# Patient Record
Sex: FEMALE | Race: WHITE | NOT HISPANIC OR LATINO | Employment: OTHER | URBAN - METROPOLITAN AREA
[De-identification: names, ages, dates, MRNs, and addresses within clinical notes are randomized per-mention and may not be internally consistent; named-entity substitution may affect disease eponyms.]

---

## 2019-04-26 ENCOUNTER — HOSPITAL ENCOUNTER (OUTPATIENT)
Dept: RADIOLOGY | Facility: HOSPITAL | Age: 39
Discharge: HOME | End: 2019-04-26
Attending: PSYCHIATRY & NEUROLOGY
Payer: COMMERCIAL

## 2019-04-26 DIAGNOSIS — E53.8 BIOTIN-(PROPIONYL-COA-CARBOXYLASE) LIGASE DEFICIENCY: ICD-10-CM

## 2019-04-26 DIAGNOSIS — Z79.899 NEED FOR PROPHYLACTIC CHEMOTHERAPY: Primary | ICD-10-CM

## 2019-04-26 DIAGNOSIS — R20.0 TACTILE ANESTHESIA: ICD-10-CM

## 2019-04-26 DIAGNOSIS — G95.9 CERVICAL MYELOPATHY: ICD-10-CM

## 2019-04-26 DIAGNOSIS — R20.0 NUMBNESS: ICD-10-CM

## 2019-04-26 DIAGNOSIS — E53.8 B12 DEFICIENCY: ICD-10-CM

## 2019-04-26 PROCEDURE — 72141 MRI NECK SPINE W/O DYE: CPT | Mod: TC,PO

## 2019-04-26 PROCEDURE — 72141 MRI CERVICAL SPINE WITHOUT CONTRAST: ICD-10-PCS | Mod: 26,,, | Performed by: RADIOLOGY

## 2019-04-26 PROCEDURE — 72141 MRI NECK SPINE W/O DYE: CPT | Mod: 26,,, | Performed by: RADIOLOGY

## 2021-03-18 ENCOUNTER — IMMUNIZATION (OUTPATIENT)
Dept: PRIMARY CARE CLINIC | Facility: CLINIC | Age: 41
End: 2021-03-18

## 2021-03-18 DIAGNOSIS — Z23 NEED FOR VACCINATION: Primary | ICD-10-CM

## 2021-03-18 PROCEDURE — 0011A COVID-19, MRNA, LNP-S, PF, 100 MCG/0.5 ML DOSE VACCINE: ICD-10-PCS | Mod: CV19,S$GLB,, | Performed by: FAMILY MEDICINE

## 2021-03-18 PROCEDURE — 91301 COVID-19, MRNA, LNP-S, PF, 100 MCG/0.5 ML DOSE VACCINE: ICD-10-PCS | Mod: S$GLB,,, | Performed by: FAMILY MEDICINE

## 2021-03-18 PROCEDURE — 91301 COVID-19, MRNA, LNP-S, PF, 100 MCG/0.5 ML DOSE VACCINE: CPT | Mod: S$GLB,,, | Performed by: FAMILY MEDICINE

## 2021-03-18 PROCEDURE — 0011A COVID-19, MRNA, LNP-S, PF, 100 MCG/0.5 ML DOSE VACCINE: CPT | Mod: CV19,S$GLB,, | Performed by: FAMILY MEDICINE

## 2021-04-15 ENCOUNTER — IMMUNIZATION (OUTPATIENT)
Dept: PRIMARY CARE CLINIC | Facility: CLINIC | Age: 41
End: 2021-04-15

## 2021-04-15 DIAGNOSIS — Z23 NEED FOR VACCINATION: Primary | ICD-10-CM

## 2021-04-15 PROCEDURE — 91301 COVID-19, MRNA, LNP-S, PF, 100 MCG/0.5 ML DOSE VACCINE: CPT | Mod: S$GLB,,, | Performed by: FAMILY MEDICINE

## 2021-04-15 PROCEDURE — 0012A COVID-19, MRNA, LNP-S, PF, 100 MCG/0.5 ML DOSE VACCINE: CPT | Mod: CV19,S$GLB,, | Performed by: FAMILY MEDICINE

## 2021-04-15 PROCEDURE — 91301 COVID-19, MRNA, LNP-S, PF, 100 MCG/0.5 ML DOSE VACCINE: ICD-10-PCS | Mod: S$GLB,,, | Performed by: FAMILY MEDICINE

## 2021-04-15 PROCEDURE — 0012A COVID-19, MRNA, LNP-S, PF, 100 MCG/0.5 ML DOSE VACCINE: ICD-10-PCS | Mod: CV19,S$GLB,, | Performed by: FAMILY MEDICINE

## 2021-07-28 ENCOUNTER — HOSPITAL ENCOUNTER (OUTPATIENT)
Facility: HOSPITAL | Age: 41
Discharge: HOME | End: 2021-07-28
Attending: INTERNAL MEDICINE | Admitting: INTERNAL MEDICINE
Payer: COMMERCIAL

## 2021-07-28 PROCEDURE — 91110 GI TRC IMG INTRAL ESOPH-ILE: CPT

## 2022-10-07 PROBLEM — E03.9 HYPOTHYROIDISM: Status: ACTIVE | Noted: 2022-10-07

## 2024-12-18 ENCOUNTER — HOSPITAL ENCOUNTER (EMERGENCY)
Facility: HOSPITAL | Age: 44
Discharge: HOME | End: 2024-12-18
Attending: EMERGENCY MEDICINE
Payer: COMMERCIAL

## 2024-12-18 VITALS
RESPIRATION RATE: 18 BRPM | TEMPERATURE: 99 F | HEART RATE: 103 BPM | OXYGEN SATURATION: 100 % | WEIGHT: 224.44 LBS | BODY MASS INDEX: 39.76 KG/M2 | DIASTOLIC BLOOD PRESSURE: 102 MMHG | SYSTOLIC BLOOD PRESSURE: 165 MMHG

## 2024-12-18 DIAGNOSIS — R10.9 RIGHT FLANK PAIN: Primary | ICD-10-CM

## 2024-12-18 DIAGNOSIS — N20.1 URETEROLITHIASIS: ICD-10-CM

## 2024-12-18 LAB
ALBUMIN SERPL BCP-MCNC: 3.8 G/DL (ref 3.5–5.2)
ALP SERPL-CCNC: 111 U/L (ref 40–150)
ALT SERPL W/O P-5'-P-CCNC: 12 U/L (ref 10–44)
ANION GAP SERPL CALC-SCNC: 13 MMOL/L (ref 8–16)
AST SERPL-CCNC: 14 U/L (ref 10–40)
B-HCG UR QL: NEGATIVE
BACTERIA #/AREA URNS AUTO: ABNORMAL /HPF
BASOPHILS # BLD AUTO: 0.06 K/UL (ref 0–0.2)
BASOPHILS NFR BLD: 0.5 % (ref 0–1.9)
BILIRUB SERPL-MCNC: 0.2 MG/DL (ref 0.1–1)
BILIRUB UR QL STRIP: NEGATIVE
BUN SERPL-MCNC: 10 MG/DL (ref 6–20)
CALCIUM SERPL-MCNC: 9.3 MG/DL (ref 8.7–10.5)
CHLORIDE SERPL-SCNC: 105 MMOL/L (ref 95–110)
CLARITY UR REFRACT.AUTO: ABNORMAL
CO2 SERPL-SCNC: 24 MMOL/L (ref 23–29)
COLOR UR AUTO: YELLOW
CREAT SERPL-MCNC: 0.8 MG/DL (ref 0.5–1.4)
DIFFERENTIAL METHOD BLD: ABNORMAL
EOSINOPHIL # BLD AUTO: 0.2 K/UL (ref 0–0.5)
EOSINOPHIL NFR BLD: 1.7 % (ref 0–8)
ERYTHROCYTE [DISTWIDTH] IN BLOOD BY AUTOMATED COUNT: 13.3 % (ref 11.5–14.5)
EST. GFR  (NO RACE VARIABLE): >60 ML/MIN/1.73 M^2
GLUCOSE SERPL-MCNC: 106 MG/DL (ref 70–110)
GLUCOSE UR QL STRIP: NEGATIVE
HCT VFR BLD AUTO: 38.9 % (ref 37–48.5)
HGB BLD-MCNC: 13.1 G/DL (ref 12–16)
HGB UR QL STRIP: ABNORMAL
IMM GRANULOCYTES # BLD AUTO: 0.05 K/UL (ref 0–0.04)
IMM GRANULOCYTES NFR BLD AUTO: 0.4 % (ref 0–0.5)
KETONES UR QL STRIP: NEGATIVE
LEUKOCYTE ESTERASE UR QL STRIP: NEGATIVE
LYMPHOCYTES # BLD AUTO: 2.6 K/UL (ref 1–4.8)
LYMPHOCYTES NFR BLD: 20.7 % (ref 18–48)
MCH RBC QN AUTO: 28.8 PG (ref 27–31)
MCHC RBC AUTO-ENTMCNC: 33.7 G/DL (ref 32–36)
MCV RBC AUTO: 86 FL (ref 82–98)
MICROSCOPIC COMMENT: ABNORMAL
MONOCYTES # BLD AUTO: 0.8 K/UL (ref 0.3–1)
MONOCYTES NFR BLD: 6.2 % (ref 4–15)
NEUTROPHILS # BLD AUTO: 8.7 K/UL (ref 1.8–7.7)
NEUTROPHILS NFR BLD: 70.5 % (ref 38–73)
NITRITE UR QL STRIP: NEGATIVE
NRBC BLD-RTO: 0 /100 WBC
PH UR STRIP: 6 [PH] (ref 5–8)
PLATELET # BLD AUTO: 321 K/UL (ref 150–450)
PMV BLD AUTO: 10.4 FL (ref 9.2–12.9)
POTASSIUM SERPL-SCNC: 3.5 MMOL/L (ref 3.5–5.1)
PROT SERPL-MCNC: 7.7 G/DL (ref 6–8.4)
PROT UR QL STRIP: NEGATIVE
RBC # BLD AUTO: 4.55 M/UL (ref 4–5.4)
RBC #/AREA URNS AUTO: >100 /HPF (ref 0–4)
SODIUM SERPL-SCNC: 142 MMOL/L (ref 136–145)
SP GR UR STRIP: 1.01 (ref 1–1.03)
SQUAMOUS #/AREA URNS AUTO: 2 /HPF
URN SPEC COLLECT METH UR: ABNORMAL
UROBILINOGEN UR STRIP-ACNC: NEGATIVE EU/DL
WBC # BLD AUTO: 12.32 K/UL (ref 3.9–12.7)
WBC #/AREA URNS AUTO: 1 /HPF (ref 0–5)

## 2024-12-18 PROCEDURE — 85025 COMPLETE CBC W/AUTO DIFF WBC: CPT | Mod: ER | Performed by: EMERGENCY MEDICINE

## 2024-12-18 PROCEDURE — 99285 EMERGENCY DEPT VISIT HI MDM: CPT | Mod: 25,ER

## 2024-12-18 PROCEDURE — 81000 URINALYSIS NONAUTO W/SCOPE: CPT | Mod: ER | Performed by: EMERGENCY MEDICINE

## 2024-12-18 PROCEDURE — 81025 URINE PREGNANCY TEST: CPT | Mod: ER | Performed by: EMERGENCY MEDICINE

## 2024-12-18 PROCEDURE — 25500020 PHARM REV CODE 255: Mod: ER | Performed by: EMERGENCY MEDICINE

## 2024-12-18 PROCEDURE — 80053 COMPREHEN METABOLIC PANEL: CPT | Mod: ER | Performed by: EMERGENCY MEDICINE

## 2024-12-18 RX ORDER — TAMSULOSIN HYDROCHLORIDE 0.4 MG/1
0.4 CAPSULE ORAL DAILY
Qty: 30 CAPSULE | Refills: 0 | Status: SHIPPED | OUTPATIENT
Start: 2024-12-18 | End: 2025-12-18

## 2024-12-18 RX ORDER — NAPROXEN 500 MG/1
500 TABLET ORAL 2 TIMES DAILY WITH MEALS
Qty: 60 TABLET | Refills: 0 | Status: SHIPPED | OUTPATIENT
Start: 2024-12-18

## 2024-12-18 RX ADMIN — IOHEXOL 100 ML: 350 INJECTION, SOLUTION INTRAVENOUS at 06:12

## 2024-12-18 NOTE — ED PROVIDER NOTES
Encounter Date: 12/18/2024       History     Chief Complaint   Patient presents with    Flank Pain     Right flank and lower abd pain began today. Increased urgency. Nausea.      The history is provided by the patient.   Flank Pain  This is a new problem. The current episode started 3 to 5 hours ago. The problem occurs constantly. The problem has not changed since onset.Associated symptoms include abdominal pain. Pertinent negatives include no chest pain, no headaches and no shortness of breath.     Review of patient's allergies indicates:   Allergen Reactions    Wool     Adhesive Rash    Clarithromycin Nausea And Vomiting and Other (See Comments)     GI INTOL    Clindamycin Nausea And Vomiting and Other (See Comments)     Past Medical History:   Diagnosis Date    Crohn's colitis      Past Surgical History:   Procedure Laterality Date    INTRALUMINAL GASTROINTESTINAL TRACT IMAGING VIA CAPSULE N/A 7/28/2021    Procedure: IMAGING PROCEDURE, GI TRACT, INTRALUMINAL, VIA CAPSULE;  Surgeon: Misbah Menendez RN;  Location: [facility];  Service: Endoscopy;  Laterality: N/A;     Family History   Problem Relation Name Age of Onset    Hypertension Father      Breast cancer Maternal Aunt      Hypertension Maternal Grandmother      Hypertension Maternal Grandfather       Social History     Tobacco Use    Smoking status: Never    Smokeless tobacco: Never   Substance Use Topics    Alcohol use: Not Currently    Drug use: Yes     Types: Marijuana     Review of Systems   Constitutional:  Negative for fever.   HENT: Negative.  Negative for congestion and sore throat.    Eyes: Negative.    Respiratory: Negative.  Negative for cough and shortness of breath.    Cardiovascular:  Negative for chest pain.   Gastrointestinal:  Positive for abdominal pain. Negative for nausea and vomiting.   Genitourinary:  Positive for flank pain. Negative for dysuria.   Neurological:  Negative for weakness, numbness and headaches.   Psychiatric/Behavioral:   Negative for confusion.        Physical Exam     Initial Vitals [12/18/24 1736]   BP Pulse Resp Temp SpO2   (!) 165/102 103 18 98.7 °F (37.1 °C) 100 %      MAP       --         Physical Exam    Constitutional: She appears well-developed and well-nourished. No distress.   HENT:   Head: Normocephalic and atraumatic.   Eyes: Conjunctivae are normal. Pupils are equal, round, and reactive to light.   Neck: Neck supple.   Normal range of motion.  Cardiovascular:  Regular rhythm and normal heart sounds.   Tachycardia present.         Pulmonary/Chest: Breath sounds normal.   Abdominal: Abdomen is soft. Bowel sounds are normal. She exhibits no distension. There is no abdominal tenderness. There is no rebound.   Musculoskeletal:         General: No edema. Normal range of motion.      Cervical back: Normal range of motion and neck supple.     Neurological: She is alert and oriented to person, place, and time. She has normal strength.   Skin: Skin is warm and dry.   Psychiatric: She has a normal mood and affect.         ED Course   Procedures  Labs Reviewed   CBC W/ AUTO DIFFERENTIAL - Abnormal       Result Value    WBC 12.32      RBC 4.55      Hemoglobin 13.1      Hematocrit 38.9      MCV 86      MCH 28.8      MCHC 33.7      RDW 13.3      Platelets 321      MPV 10.4      Immature Granulocytes 0.4      Gran # (ANC) 8.7 (*)     Immature Grans (Abs) 0.05 (*)     Lymph # 2.6      Mono # 0.8      Eos # 0.2      Baso # 0.06      nRBC 0      Gran % 70.5      Lymph % 20.7      Mono % 6.2      Eosinophil % 1.7      Basophil % 0.5      Differential Method Automated     URINALYSIS, REFLEX TO URINE CULTURE - Abnormal    Specimen UA Urine, Clean Catch      Color, UA Yellow      Appearance, UA Hazy (*)     pH, UA 6.0      Specific Gravity, UA 1.015      Protein, UA Negative      Glucose, UA Negative      Ketones, UA Negative      Bilirubin (UA) Negative      Occult Blood UA 3+ (*)     Nitrite, UA Negative      Urobilinogen, UA Negative       Leukocytes, UA Negative      Narrative:     Specimen Source->Urine   URINALYSIS MICROSCOPIC - Abnormal    RBC, UA >100 (*)     WBC, UA 1      Bacteria Rare      Squam Epithel, UA 2      Microscopic Comment SEE COMMENT      Narrative:     Specimen Source->Urine   COMPREHENSIVE METABOLIC PANEL    Sodium 142      Potassium 3.5      Chloride 105      CO2 24      Glucose 106      BUN 10      Creatinine 0.8      Calcium 9.3      Total Protein 7.7      Albumin 3.8      Total Bilirubin 0.2      Alkaline Phosphatase 111      AST 14      ALT 12      eGFR >60.0      Anion Gap 13     PREGNANCY TEST, URINE RAPID    Preg Test, Ur Negative            Imaging Results               CT Abdomen Pelvis With IV Contrast NO Oral Contrast (Final result)  Result time 12/18/24 18:55:03      Final result by Dae Hammond MD (12/18/24 18:55:03)                   Impression:      3 mm distal right ureteral stone. There appears to be soft tissue thickening of the distal ureter near the ureterovesicular junction. This could relate to infection but neoplasm is not excluded on the basis of this exam.  Moderate to severe right hydronephrosis out of proportion to the distal ureteral stone. Consider further evaluation with cystoscopy when clinically appropriate.    Marked pelvic vascularity.  Possible pelvic venous congestion syndrome.    This report was flagged in Epic as abnormal.    All CT scans at this facility are performed  using dose modulation techniques as appropriate to performed exam including the following:  automated exposure control; adjustment of mA and/or kV according to the patients size (this includes techniques or standardized protocols for targeted exams where dose is matched to indication/reason for exam: i.e. extremities or head);  iterative reconstruction technique.      Electronically signed by: Dae Hammond  Date:    12/18/2024  Time:    18:55               Narrative:    EXAMINATION:  CT ABDOMEN PELVIS WITH IV  CONTRAST    CLINICAL HISTORY:  RLQ abdominal pain (Age >= 14y);    TECHNIQUE:  Low dose axial images, sagittal and coronal reformations were obtained from the lung bases to the pubic symphysis.  Contrast was administered.  Images acquired after the administration 100 mL Omnipaque 350 IV contrast.    COMPARISON:  None    FINDINGS:  Heart: Normal in size. No pericardial effusion.    Lung Bases: Well aerated, without consolidation or pleural fluid.    Liver: Normal in size and attenuation, with no focal hepatic lesions.    Gallbladder: No calcified gallstones.    Bile Ducts: No evidence of dilated ducts.    Pancreas: No mass or peripancreatic fat stranding.    Spleen: Unremarkable.    Adrenals: Unremarkable.    Kidneys/ Ureters: 3 mm distal right ureteral stone.  There appears to be soft tissue thickening of the distal ureter near the ureterovesicular junction.  This could relate to infection but neoplasm is not excluded on the basis of this exam.  Moderate to severe right hydronephrosis out of proportion to the distal ureteral stone.  Consider further evaluation with cystoscopy when clinically appropriate.    Bladder: No evidence of wall thickening.    Reproductive organs: Marked pelvic vascularity possibly pelvic venous congestion syndrome.  Further evaluation as needed.    GI Tract/Mesentery: No evidence of bowel obstruction or inflammation.  No evidence of appendicitis.    Peritoneal Space: No ascites. No free air.    Retroperitoneum: No significant adenopathy.    Abdominal wall: Unremarkable.    Vasculature: No significant atherosclerosis or aneurysm.    Bones: No acute fracture.                                       Medications   iohexoL (OMNIPAQUE 350) injection 100 mL (100 mLs Intravenous Given 12/18/24 5938)     Medical Decision Making  DDx Uti, kidney stone, appendicitis    Problems Addressed:  Right flank pain: acute illness or injury  Ureterolithiasis: undiagnosed new problem with uncertain  prognosis    Amount and/or Complexity of Data Reviewed  Labs: ordered.  Radiology: ordered.    Risk  Prescription drug management.  Decision regarding hospitalization.  Risk Details: Discussed nature of kidney stone, severe hydro, need for close f/u c Urology, pt states she has a Urologist that she sees and can f/u this week. Pt refusing pain meds, agrees to flomax. Pt in nad./    7:06 PM - Counseling: Spoke with the patient and discussed todays findings, in addition to providing specific details for the plan of care and counseling regarding the diagnosis and prognosis. Questions are answered at this time.                                     Clinical Impression:  Final diagnoses:  [R10.9] Right flank pain (Primary)  [N20.1] Ureterolithiasis          ED Disposition Condition    Discharge Stable          ED Prescriptions       Medication Sig Dispense Start Date End Date Auth. Provider    tamsulosin (FLOMAX) 0.4 mg Cap Take 1 capsule (0.4 mg total) by mouth once daily. 30 capsule 12/18/2024 12/18/2025 Fazal Hernandez MD    naproxen (NAPROSYN) 500 MG tablet Take 1 tablet (500 mg total) by mouth 2 (two) times daily with meals. 60 tablet 12/18/2024 -- Fazal Hernandez MD          Follow-up Information       Follow up With Specialties Details Why Contact Info Additional Information    [facility] Schedule an appointment as soon as possible for a visit   [address] [facility] [id number] [phone] Please park on the [address] side and use the Clinic entrance. Check in on the 3rd floor, to the right.    Misael Bowling MD Endocrinology Schedule an appointment as soon as possible for a visit   [address] [facility] [address] [phone]                Fazal Hernandez MD  12/18/24 8885

## 2025-05-21 ENCOUNTER — ANESTHESIA (OUTPATIENT)
Dept: ANESTHESIOLOGY | Facility: HOSPITAL | Age: 45
End: 2025-05-21
Payer: COMMERCIAL

## 2025-05-21 ENCOUNTER — HOSPITAL ENCOUNTER (INPATIENT)
Facility: HOSPITAL | Age: 45
LOS: 2 days | Discharge: HOME | DRG: 163 | End: 2025-05-23
Attending: EMERGENCY MEDICINE | Admitting: FAMILY MEDICINE
Payer: COMMERCIAL

## 2025-05-21 DIAGNOSIS — I26.99 PULMONARY EMBOLISM, BILATERAL: ICD-10-CM

## 2025-05-21 DIAGNOSIS — R79.89 ELEVATED TROPONIN: ICD-10-CM

## 2025-05-21 DIAGNOSIS — R06.02 SOB (SHORTNESS OF BREATH): Primary | ICD-10-CM

## 2025-05-21 DIAGNOSIS — I26.99 ACUTE PULMONARY EMBOLISM, UNSPECIFIED PULMONARY EMBOLISM TYPE, UNSPECIFIED WHETHER ACUTE COR PULMONALE PRESENT: ICD-10-CM

## 2025-05-21 DIAGNOSIS — R00.0 TACHYCARDIA: ICD-10-CM

## 2025-05-21 LAB
ABSOLUTE EOSINOPHIL (OHS): 0.09 K/UL
ABSOLUTE MONOCYTE (OHS): 0.81 K/UL (ref 0.3–1)
ABSOLUTE NEUTROPHIL COUNT (OHS): 10.3 K/UL (ref 1.8–7.7)
ALBUMIN SERPL BCP-MCNC: 3.9 G/DL (ref 3.5–5.2)
ALP SERPL-CCNC: 104 UNIT/L (ref 40–150)
ALT SERPL W/O P-5'-P-CCNC: 21 UNIT/L (ref 10–44)
ANION GAP (OHS): 14 MMOL/L (ref 8–16)
APTT PPP: 25.7 SECONDS (ref 21–32)
AST SERPL-CCNC: 23 UNIT/L (ref 11–45)
BASOPHILS # BLD AUTO: 0.03 K/UL
BASOPHILS NFR BLD AUTO: 0.2 %
BILIRUB SERPL-MCNC: 0.3 MG/DL (ref 0.1–1)
BNP SERPL-MCNC: 45 PG/ML (ref 0–99)
BUN SERPL-MCNC: 14 MG/DL (ref 6–20)
CALCIUM SERPL-MCNC: 9.5 MG/DL (ref 8.7–10.5)
CHLORIDE SERPL-SCNC: 106 MMOL/L (ref 95–110)
CO2 SERPL-SCNC: 21 MMOL/L (ref 23–29)
CREAT SERPL-MCNC: 1 MG/DL (ref 0.5–1.4)
CTP QC/QA: YES
CTP QC/QA: YES
D DIMER PPP IA.FEU-MCNC: 2.26 MG/L FEU
ERYTHROCYTE [DISTWIDTH] IN BLOOD BY AUTOMATED COUNT: 13 % (ref 11.5–14.5)
GFR SERPLBLD CREATININE-BSD FMLA CKD-EPI: >60 ML/MIN/1.73/M2
GLUCOSE SERPL-MCNC: 114 MG/DL (ref 70–110)
HCT VFR BLD AUTO: 40.7 % (ref 37–48.5)
HGB BLD-MCNC: 13.5 GM/DL (ref 12–16)
HOLD SPECIMEN: NORMAL
IMM GRANULOCYTES # BLD AUTO: 0.04 K/UL (ref 0–0.04)
IMM GRANULOCYTES NFR BLD AUTO: 0.3 % (ref 0–0.5)
INR PPP: 0.9 (ref 0.8–1.2)
LYMPHOCYTES # BLD AUTO: 2.04 K/UL (ref 1–4.8)
MCH RBC QN AUTO: 28.5 PG (ref 27–31)
MCHC RBC AUTO-ENTMCNC: 33.2 G/DL (ref 32–36)
MCV RBC AUTO: 86 FL (ref 82–98)
NUCLEATED RBC (/100WBC) (OHS): 0 /100 WBC
PLATELET # BLD AUTO: 296 K/UL (ref 150–450)
PMV BLD AUTO: 10.6 FL (ref 9.2–12.9)
POC MOLECULAR INFLUENZA A AGN: NEGATIVE
POC MOLECULAR INFLUENZA B AGN: NEGATIVE
POTASSIUM SERPL-SCNC: 3.5 MMOL/L (ref 3.5–5.1)
PROT SERPL-MCNC: 7.8 GM/DL (ref 6–8.4)
PROTHROMBIN TIME: 10.7 SECONDS (ref 9–12.5)
RBC # BLD AUTO: 4.74 M/UL (ref 4–5.4)
RELATIVE EOSINOPHIL (OHS): 0.7 %
RELATIVE LYMPHOCYTE (OHS): 15.3 % (ref 18–48)
RELATIVE MONOCYTE (OHS): 6.1 % (ref 4–15)
RELATIVE NEUTROPHIL (OHS): 77.4 % (ref 38–73)
SARS-COV-2 RDRP RESP QL NAA+PROBE: NEGATIVE
SODIUM SERPL-SCNC: 141 MMOL/L (ref 136–145)
TROPONIN I SERPL DL<=0.01 NG/ML-MCNC: 1.27 NG/ML
TSH SERPL-ACNC: 1.99 UIU/ML (ref 0.4–4)
WBC # BLD AUTO: 13.31 K/UL (ref 3.9–12.7)

## 2025-05-21 PROCEDURE — 94761 N-INVAS EAR/PLS OXIMETRY MLT: CPT | Mod: ER

## 2025-05-21 PROCEDURE — 85610 PROTHROMBIN TIME: CPT | Mod: ER | Performed by: EMERGENCY MEDICINE

## 2025-05-21 PROCEDURE — 27000221 HC OXYGEN, UP TO 24 HOURS: Mod: ER

## 2025-05-21 PROCEDURE — 82306 VITAMIN D 25 HYDROXY: CPT | Performed by: FAMILY MEDICINE

## 2025-05-21 PROCEDURE — 84484 ASSAY OF TROPONIN QUANT: CPT | Mod: ER | Performed by: EMERGENCY MEDICINE

## 2025-05-21 PROCEDURE — 85025 COMPLETE CBC W/AUTO DIFF WBC: CPT | Mod: ER | Performed by: EMERGENCY MEDICINE

## 2025-05-21 PROCEDURE — 83880 ASSAY OF NATRIURETIC PEPTIDE: CPT | Mod: ER | Performed by: EMERGENCY MEDICINE

## 2025-05-21 PROCEDURE — 93010 ELECTROCARDIOGRAM REPORT: CPT | Mod: ,,, | Performed by: INTERNAL MEDICINE

## 2025-05-21 PROCEDURE — 37000008 HC ANESTHESIA 1ST 15 MINUTES: Performed by: STUDENT IN AN ORGANIZED HEALTH CARE EDUCATION/TRAINING PROGRAM

## 2025-05-21 PROCEDURE — 99285 EMERGENCY DEPT VISIT HI MDM: CPT | Mod: 25,ER

## 2025-05-21 PROCEDURE — 84443 ASSAY THYROID STIM HORMONE: CPT | Mod: ER | Performed by: EMERGENCY MEDICINE

## 2025-05-21 PROCEDURE — 21400001 HC TELEMETRY ROOM

## 2025-05-21 PROCEDURE — 86803 HEPATITIS C AB TEST: CPT | Performed by: EMERGENCY MEDICINE

## 2025-05-21 PROCEDURE — 85379 FIBRIN DEGRADATION QUANT: CPT | Mod: ER | Performed by: EMERGENCY MEDICINE

## 2025-05-21 PROCEDURE — 63600175 PHARM REV CODE 636 W HCPCS: Performed by: EMERGENCY MEDICINE

## 2025-05-21 PROCEDURE — 25000003 PHARM REV CODE 250: Mod: ER | Performed by: EMERGENCY MEDICINE

## 2025-05-21 PROCEDURE — 37000009 HC ANESTHESIA EA ADD 15 MINS: Performed by: STUDENT IN AN ORGANIZED HEALTH CARE EDUCATION/TRAINING PROGRAM

## 2025-05-21 PROCEDURE — 87635 SARS-COV-2 COVID-19 AMP PRB: CPT | Mod: ER | Performed by: EMERGENCY MEDICINE

## 2025-05-21 PROCEDURE — 80053 COMPREHEN METABOLIC PANEL: CPT | Mod: ER | Performed by: EMERGENCY MEDICINE

## 2025-05-21 PROCEDURE — 85730 THROMBOPLASTIN TIME PARTIAL: CPT | Mod: ER | Performed by: EMERGENCY MEDICINE

## 2025-05-21 PROCEDURE — 87502 INFLUENZA DNA AMP PROBE: CPT | Mod: ER

## 2025-05-21 PROCEDURE — 25500020 PHARM REV CODE 255: Mod: ER | Performed by: EMERGENCY MEDICINE

## 2025-05-21 PROCEDURE — 87389 HIV-1 AG W/HIV-1&-2 AB AG IA: CPT | Performed by: EMERGENCY MEDICINE

## 2025-05-21 PROCEDURE — 63600175 PHARM REV CODE 636 W HCPCS: Mod: ER | Performed by: EMERGENCY MEDICINE

## 2025-05-21 PROCEDURE — 93005 ELECTROCARDIOGRAM TRACING: CPT | Mod: ER

## 2025-05-21 RX ORDER — HEPARIN SODIUM,PORCINE/D5W 25000/250
0-40 INTRAVENOUS SOLUTION INTRAVENOUS CONTINUOUS
Status: DISCONTINUED | OUTPATIENT
Start: 2025-05-21 | End: 2025-05-21

## 2025-05-21 RX ORDER — HEPARIN SODIUM,PORCINE/D5W 25000/250
0-40 INTRAVENOUS SOLUTION INTRAVENOUS CONTINUOUS
Status: DISCONTINUED | OUTPATIENT
Start: 2025-05-21 | End: 2025-05-22

## 2025-05-21 RX ORDER — FENTANYL CITRATE 50 UG/ML
50 INJECTION, SOLUTION INTRAMUSCULAR; INTRAVENOUS
Refills: 0 | Status: COMPLETED | OUTPATIENT
Start: 2025-05-21 | End: 2025-05-21

## 2025-05-21 RX ORDER — MORPHINE SULFATE 4 MG/ML
4 INJECTION, SOLUTION INTRAMUSCULAR; INTRAVENOUS
Refills: 0 | Status: DISCONTINUED | OUTPATIENT
Start: 2025-05-21 | End: 2025-05-21

## 2025-05-21 RX ADMIN — HEPARIN SODIUM 18 UNITS/KG/HR: 10000 INJECTION, SOLUTION INTRAVENOUS at 11:05

## 2025-05-21 RX ADMIN — IOHEXOL 100 ML: 350 INJECTION, SOLUTION INTRAVENOUS at 07:05

## 2025-05-21 RX ADMIN — HEPARIN SODIUM 18 UNITS/KG/HR: 10000 INJECTION, SOLUTION INTRAVENOUS at 09:05

## 2025-05-21 RX ADMIN — FENTANYL CITRATE 50 MCG: 50 INJECTION INTRAMUSCULAR; INTRAVENOUS at 10:05

## 2025-05-21 RX ADMIN — SODIUM CHLORIDE 1000 ML: 9 INJECTION, SOLUTION INTRAVENOUS at 06:05

## 2025-05-21 NOTE — ED PROVIDER NOTES
"Encounter Date: 5/21/2025       History     Chief Complaint   Patient presents with    Shortness of Breath     That began around lunch. Pt reports " my heart is racing." Feels tightness to chest. Pt reports falling on Monday. Broke left leg to distal fibula.     46 y/o F with PMH of Crohn's disease here with c/o SOB. She was at work when she rolled her ankle 2d ago, and XR revealed distal fibula fracture at level of the ankle. She was placed in a boot, and will f/u back up in 4 weeks. She has become progressively more SOB since the injury, and reporting palpitations with elevated heart rate in the 100's. Denies any increased leg swelling or pain. She does not take birth control. She has been using her walking boot, and does not report sedentary behavior the past 2 days.     The history is provided by the patient.     Review of patient's allergies indicates:   Allergen Reactions    Wool     Adhesive Rash    Clarithromycin Nausea And Vomiting and Other (See Comments)     GI INTOL    Clindamycin Nausea And Vomiting and Other (See Comments)     Past Medical History:   Diagnosis Date    Crohn's colitis      Past Surgical History:   Procedure Laterality Date    INTRALUMINAL GASTROINTESTINAL TRACT IMAGING VIA CAPSULE N/A 7/28/2021    Procedure: IMAGING PROCEDURE, GI TRACT, INTRALUMINAL, VIA CAPSULE;  Surgeon: Misbah Menendez RN;  Location: [facility];  Service: Endoscopy;  Laterality: N/A;     Family History   Problem Relation Name Age of Onset    Hypertension Father      Breast cancer Maternal Aunt      Hypertension Maternal Grandmother      Hypertension Maternal Grandfather       Social History[1]  Review of Systems   Constitutional:  Negative for diaphoresis and fever.   HENT:  Negative for congestion, dental problem and sore throat.    Eyes:  Negative for pain and visual disturbance.   Respiratory:  Positive for shortness of breath. Negative for cough.    Cardiovascular:  Positive for palpitations. Negative for chest pain. "   Gastrointestinal:  Negative for abdominal pain, diarrhea, nausea and vomiting.   Genitourinary:  Negative for dysuria and flank pain.   Musculoskeletal:  Positive for arthralgias. Negative for back pain and neck pain.   Skin:  Negative for rash and wound.   Neurological:  Negative for weakness, numbness and headaches.   Psychiatric/Behavioral:  Negative for agitation and confusion.        Physical Exam     Initial Vitals [05/21/25 1720]   BP Pulse Resp Temp SpO2   133/87 (!) 128 18 98.3 °F (36.8 °C) 96 %      MAP       --         Physical Exam    Constitutional: She appears well-developed and well-nourished.   HENT:   Head: Normocephalic and atraumatic.   Eyes: EOM are normal. Pupils are equal, round, and reactive to light.   Neck: Neck supple.   Normal range of motion.  Cardiovascular:  Regular rhythm and intact distal pulses.           tachycardic   Pulmonary/Chest: Breath sounds normal. No respiratory distress.   Abdominal: She exhibits no distension. There is no abdominal tenderness.   Musculoskeletal:      Cervical back: Normal range of motion and neck supple.      Comments: There is left ankle boot present, and when removed minimal edema around lower leg and ankle. No proximal pre-tibial edema. Mild lateral malleolar tenderness.      Neurological: She is alert and oriented to person, place, and time. She has normal strength. No sensory deficit.   Skin: Skin is warm and dry.   Psychiatric: She has a normal mood and affect.         ED Course   Critical Care    Date/Time: 5/21/2025 10:58 PM    Performed by: Magen Oscar Jr., MD  Authorized by: Magen Oscar Jr., MD  Direct patient critical care time: 10 minutes  Additional history critical care time: 10 minutes  Ordering / reviewing critical care time: 10 minutes  Documentation critical care time: 10 minutes  Consulting other physicians critical care time: 10 minutes  Consult with family critical care time: 10 minutes  Other critical care time: 15  minutes  Total critical care time (exclusive of procedural time) : 75 minutes  Critical care time was exclusive of separately billable procedures and treating other patients and teaching time.  Critical care was necessary to treat or prevent imminent or life-threatening deterioration of the following conditions: circulatory failure, cardiac failure and respiratory failure.  Critical care was time spent personally by me on the following activities: blood draw for specimens, development of treatment plan with patient or surrogate, discussions with consultants, interpretation of cardiac output measurements, evaluation of patient's response to treatment, examination of patient, obtaining history from patient or surrogate, ordering and performing treatments and interventions, ordering and review of laboratory studies, ordering and review of radiographic studies, pulse oximetry, re-evaluation of patient's condition and review of old charts.        Labs Reviewed   D DIMER, QUANTITATIVE - Abnormal       Result Value    D-Dimer 2.26 (*)    TROPONIN I - Abnormal    Troponin-I 1.275 (*)    COMPREHENSIVE METABOLIC PANEL - Abnormal    Sodium 141      Potassium 3.5      Chloride 106      CO2 21 (*)     Glucose 114 (*)     BUN 14      Creatinine 1.0      Calcium 9.5      Protein Total 7.8      Albumin 3.9      Bilirubin Total 0.3            AST 23      ALT 21      Anion Gap 14      eGFR >60     CBC WITH DIFFERENTIAL - Abnormal    WBC 13.31 (*)     RBC 4.74      HGB 13.5      HCT 40.7      MCV 86      MCH 28.5      MCHC 33.2      RDW 13.0      Platelet Count 296      MPV 10.6      Nucleated RBC 0      Neut % 77.4 (*)     Lymph % 15.3 (*)     Mono % 6.1      Eos % 0.7      Basophil % 0.2      Imm Grans % 0.3      Neut # 10.30 (*)     Lymph # 2.04      Mono # 0.81      Eos # 0.09      Baso # 0.03      Imm Grans # 0.04     TSH - Normal    TSH 1.992     B-TYPE NATRIURETIC PEPTIDE - Normal    BNP 45     APTT - Normal    PTT 25.7      PROTIME-INR - Normal    PT 10.7      INR 0.9     HEP C VIRUS HOLD SPECIMEN    Extra Tube Hold for add-ons.     CBC W/ AUTO DIFFERENTIAL    Narrative:     The following orders were created for panel order CBC auto differential.  Procedure                               Abnormality         Status                     ---------                               -----------         ------                     CBC with Differential[[id number]]       Abnormal            Final result                 Please view results for these tests on the individual orders.   HEPATITIS C ANTIBODY   HIV 1 / 2 ANTIBODY   SARS-COV-2 RDRP GENE    POC Rapid COVID Negative       Acceptable Yes     POCT INFLUENZA A/B MOLECULAR    POC Molecular Influenza A Ag Negative      POC Molecular Influenza B Ag Negative       Acceptable Yes       EKG Readings: (Independently Interpreted)   Rate of 124 beats per minute.  Sinus tachycardia.  Normal axis.  P.r., QRS and QTC intervals within normal limits.  No STEMI.       Imaging Results               CTA Chest Non-Coronary (PE Studies) (Final result)  Result time 05/21/25 19:59:41      Final result by Charles LopezFrankiMD mirna (05/21/25 19:59:41)                   Impression:      Bilateral pulmonary embolism involving the main pulmonary arteries extending into segmental and lobar branches of all lobes of the lung.  Right ventricle strain and hypertrophy is present.  No evidence of pulmonary infarction.    This report was flagged in Epic as abnormal.    All CT scans at this facility use dose modulation, iterative reconstruction and/or weight based dosing when appropriate to reduce radiation dose to as low as reasonably achievable.      Electronically signed by: Charles Lopez MD  Date:    05/21/2025  Time:    19:59               Narrative:    EXAMINATION:  CTA CHEST NON CORONARY (PE STUDIES)    CLINICAL HISTORY:  Pulmonary embolism (PE) suspected, high prob;Pulmonary embolism  (PE) suspected, positive D-dimer;    TECHNIQUE:  Standard CTA of the chest performed with 100 cc Omnipaque 350 utilizing 3-D MIP reformations.    COMPARISON:  None    FINDINGS:  Heart is normal in size.  However there is evidence of right ventricle enlargement with bulging of the intraventricular septum into the left ventricle.  Findings are compatible with right ventricle strain and hypertrophy.    No coronary artery calcifications.  No pericardial effusions    There is pulmonary embolism involving the main left pulmonary artery extending into the lobar and segmental branches of the left upper lobe and lower lobe.  There is also pulmonary embolism involving the right main pulmonary artery extending into lobar and segmental branches of the right upper middle and lower lobes.    The lungs appear clear.  No evidence of infarction.  No evidence of pleural effusion or pneumothorax.    No evidence of mediastinal hilar adenopathy.                                       X-Ray Chest AP Portable (Final result)  Result time 05/21/25 18:35:08      Final result by Charles Lopez MD (Timothy) (05/21/25 18:35:08)                   Impression:     Normal chest.    Finalized on: 5/21/2025 6:35 PM By:  Crescencio Lopez MD  [facility]# [id number]      2025-05-21 18:37:13.593     [facility]               Narrative:    EXAM: XR CHEST AP PORTABLE    CLINICAL HISTORY: Shortness of breath    FINDINGS:  Heart is normal in size.  Lungs are clear.    One view.                                         Medications   heparin 25,000 units in dextrose 5% 250 mL (100 units/mL) infusion HIGH INTENSITY nomogram - [facility] (18 Units/kg/hr × 95.3 kg Intravenous New Bag 5/21/25 7946)   heparin 25,000 units in dextrose 5% (100 units/ml) IV bolus from bag HIGH INTENSITY nomogram - [facility] (has no administration in time range)   heparin 25,000 units in dextrose 5% (100 units/ml) IV bolus from bag HIGH INTENSITY nomogram - [facility] (has no administration in time range)   sodium chloride  0.9% bolus 1,000 mL 1,000 mL (1,000 mLs Intravenous New Bag 5/21/25 1834)   iohexoL (OMNIPAQUE 350) injection 100 mL (100 mLs Intravenous Given 5/21/25 1944)   heparin 25,000 units in dextrose 5% (100 units/ml) IV bolus from bag HIGH INTENSITY nomogram - [facility] (7,624 Units Intravenous Bolus from Bag 5/21/25 2103)   fentaNYL 50 mcg/mL injection 50 mcg (50 mcg Intravenous Given 5/21/25 2208)     Medical Decision Making  Differential diagnosis includes pneumonia, COPD exacerbation, asthma exacerbation, heart failure, anemia, ACS, bronchitis, viral syndrome, influenza, covid, RSV, pneumothorax, pleural effusion, pulmonary embolism, mucus plugging, bronchiectasis, diaphragmatic paralysis, pneumoconiosis      Amount and/or Complexity of Data Reviewed  Labs: ordered. Decision-making details documented in ED Course.  Radiology: ordered and independent interpretation performed. Decision-making details documented in ED Course.  ECG/medicine tests: ordered and independent interpretation performed. Decision-making details documented in ED Course.    Risk  OTC drugs.  Prescription drug management.  Parenteral controlled substances.  Decision regarding hospitalization.  Risk Details: OTC drugs, prescription drugs and controlled substances considered.  Due to patient's symptoms improving and pain controlled pain medications ordered appropriately.  Differential diagnoses:  Pneumonia, Congestive heart failure, Acute Myocardial infarction, Pleural effusion, Pulmonary edema, Pulmonary embolism, Electrolyte imbalance, Infectious etiology, COPD exacerbation, Asthma exacerbation, Pneumothorax,  Cardiac tamponade, Anemia, Deconditioning, bronchospasm, upper airway obstruction such: Aspiration, Foreign body among others.                 ED Course as of 05/21/25 6238   Wed May 21, 2025   1859 Rhythm strip reviewed. Sinus tachycardia, no stemi. 124 bpm. [LV]      ED Course User Index  [LV] Magen Oscar Jr., MD            Vitals:    05/21/25  "1720 05/21/25 1748 05/21/25 1919 05/21/25 2001   BP: 133/87 127/79     Pulse: (!) 128 (!) 124 (!) 118 (!) 123   Resp: 18 19     Temp: 98.3 °F (36.8 °C)      TempSrc: Oral      SpO2: 96% 96% 98% 96%   Weight: 95.3 kg (210 lb)      Height: 5' 2" (1.575 m)       05/21/25 2039 05/21/25 2108 05/21/25 2121 05/21/25 2137   BP:  132/88  (!) 144/86   Pulse: (!) 124 (!) 120 (!) 121 (!) 122   Resp:       Temp:       TempSrc:       SpO2: 96% 97% 96% 96%   Weight:       Height:        05/21/25 2153 05/21/25 2208 05/21/25 2216   BP:   (!) 139/93   Pulse: (!) 118  (!) 115   Resp:  18    Temp:      TempSrc:      SpO2: 96%  98%   Weight:      Height:          Results for orders placed or performed during the hospital encounter of 05/21/25   HCV Virus Hold Specimen    Collection Time: 05/21/25  6:33 PM   Result Value Ref Range    Extra Tube Hold for add-ons.    D dimer, quantitative    Collection Time: 05/21/25  6:33 PM   Result Value Ref Range    D-Dimer 2.26 (H) <0.50 mg/L FEU   TSH    Collection Time: 05/21/25  6:33 PM   Result Value Ref Range    TSH 1.992 0.400 - 4.000 uIU/mL   Troponin I    Collection Time: 05/21/25  6:33 PM   Result Value Ref Range    Troponin-I 1.275 (H) <=0.026 ng/mL   Brain natriuretic peptide    Collection Time: 05/21/25  6:33 PM   Result Value Ref Range    BNP 45 0 - 99 pg/mL   Comprehensive metabolic panel    Collection Time: 05/21/25  6:33 PM   Result Value Ref Range    Sodium 141 136 - 145 mmol/L    Potassium 3.5 3.5 - 5.1 mmol/L    Chloride 106 95 - 110 mmol/L    CO2 21 (L) 23 - 29 mmol/L    Glucose 114 (H) 70 - 110 mg/dL    BUN 14 6 - 20 mg/dL    Creatinine 1.0 0.5 - 1.4 mg/dL    Calcium 9.5 8.7 - 10.5 mg/dL    Protein Total 7.8 6.0 - 8.4 gm/dL    Albumin 3.9 3.5 - 5.2 g/dL    Bilirubin Total 0.3 0.1 - 1.0 mg/dL     40 - 150 unit/L    AST 23 11 - 45 unit/L    ALT 21 10 - 44 unit/L    Anion Gap 14 8 - 16 mmol/L    eGFR >60 >60 mL/min/1.73/m2   CBC with Differential    Collection Time: 05/21/25  " 6:33 PM   Result Value Ref Range    WBC 13.31 (H) 3.90 - 12.70 K/uL    RBC 4.74 4.00 - 5.40 M/uL    HGB 13.5 12.0 - 16.0 gm/dL    HCT 40.7 37.0 - 48.5 %    MCV 86 82 - 98 fL    MCH 28.5 27.0 - 31.0 pg    MCHC 33.2 32.0 - 36.0 g/dL    RDW 13.0 11.5 - 14.5 %    Platelet Count 296 150 - 450 K/uL    MPV 10.6 9.2 - 12.9 fL    Nucleated RBC 0 <=0 /100 WBC    Neut % 77.4 (H) 38 - 73 %    Lymph % 15.3 (L) 18 - 48 %    Mono % 6.1 4 - 15 %    Eos % 0.7 <=8 %    Basophil % 0.2 <=1.9 %    Imm Grans % 0.3 0.0 - 0.5 %    Neut # 10.30 (H) 1.8 - 7.7 K/uL    Lymph # 2.04 1 - 4.8 K/uL    Mono # 0.81 0.3 - 1 K/uL    Eos # 0.09 <=0.5 K/uL    Baso # 0.03 <=0.2 K/uL    Imm Grans # 0.04 0.00 - 0.04 K/uL   APTT    Collection Time: 05/21/25  6:33 PM   Result Value Ref Range    PTT 25.7 21.0 - 32.0 seconds   Protime-INR    Collection Time: 05/21/25  6:33 PM   Result Value Ref Range    PT 10.7 9.0 - 12.5 seconds    INR 0.9 0.8 - 1.2   POCT COVID-19 Rapid Screening    Collection Time: 05/21/25  8:23 PM   Result Value Ref Range    POC Rapid COVID Negative Negative     Acceptable Yes    POCT Influenza A/B Molecular    Collection Time: 05/21/25  8:23 PM   Result Value Ref Range    POC Molecular Influenza A Ag Negative Negative    POC Molecular Influenza B Ag Negative Negative     Acceptable Yes          Imaging Results               CTA Chest Non-Coronary (PE Studies) (Final result)  Result time 05/21/25 19:59:41      Final result by Charles Lopez MD (Timothy) (05/21/25 19:59:41)                   Impression:      Bilateral pulmonary embolism involving the main pulmonary arteries extending into segmental and lobar branches of all lobes of the lung.  Right ventricle strain and hypertrophy is present.  No evidence of pulmonary infarction.    This report was flagged in Epic as abnormal.    All CT scans at this facility use dose modulation, iterative reconstruction and/or weight based dosing when appropriate to reduce  radiation dose to as low as reasonably achievable.      Electronically signed by: Charles Lopez MD  Date:    05/21/2025  Time:    19:59               Narrative:    EXAMINATION:  CTA CHEST NON CORONARY (PE STUDIES)    CLINICAL HISTORY:  Pulmonary embolism (PE) suspected, high prob;Pulmonary embolism (PE) suspected, positive D-dimer;    TECHNIQUE:  Standard CTA of the chest performed with 100 cc Omnipaque 350 utilizing 3-D MIP reformations.    COMPARISON:  None    FINDINGS:  Heart is normal in size.  However there is evidence of right ventricle enlargement with bulging of the intraventricular septum into the left ventricle.  Findings are compatible with right ventricle strain and hypertrophy.    No coronary artery calcifications.  No pericardial effusions    There is pulmonary embolism involving the main left pulmonary artery extending into the lobar and segmental branches of the left upper lobe and lower lobe.  There is also pulmonary embolism involving the right main pulmonary artery extending into lobar and segmental branches of the right upper middle and lower lobes.    The lungs appear clear.  No evidence of infarction.  No evidence of pleural effusion or pneumothorax.    No evidence of mediastinal hilar adenopathy.                                       X-Ray Chest AP Portable (Final result)  Result time 05/21/25 18:35:08      Final result by Charles Lopez (Franki), MD (05/21/25 18:35:08)                   Impression:     Normal chest.    Finalized on: 5/21/2025 6:35 PM By:  Crescencio Lopez MD  [facility]# [id number]      2025-05-21 18:37:13.593     [facility]               Narrative:    EXAM: XR CHEST AP PORTABLE    CLINICAL HISTORY: Shortness of breath    FINDINGS:  Heart is normal in size.  Lungs are clear.    One view.                                        Medications   heparin 25,000 units in dextrose 5% 250 mL (100 units/mL) infusion HIGH INTENSITY nomogram - [facility] (18 Units/kg/hr × 95.3 kg Intravenous New Bag 5/21/25  2103)   heparin 25,000 units in dextrose 5% (100 units/ml) IV bolus from bag HIGH INTENSITY nomogram - [facility] (has no administration in time range)   heparin 25,000 units in dextrose 5% (100 units/ml) IV bolus from bag HIGH INTENSITY nomogram - [facility] (has no administration in time range)   sodium chloride 0.9% bolus 1,000 mL 1,000 mL (1,000 mLs Intravenous New Bag 5/21/25 1834)   iohexoL (OMNIPAQUE 350) injection 100 mL (100 mLs Intravenous Given 5/21/25 1944)   heparin 25,000 units in dextrose 5% (100 units/ml) IV bolus from bag HIGH INTENSITY nomogram - [facility] (7,624 Units Intravenous Bolus from Bag 5/21/25 2103)   fentaNYL 50 mcg/mL injection 50 mcg (50 mcg Intravenous Given 5/21/25 2208)     6:00 PM - Transfer of Care: Patient care transferred from Dr. Fletcher to Dr. Oscar, pending labs and studies.      7:09 PM  - Re-evaluation:  The patient is resting comfortably and is in no acute distress. Discussed test results and notified of pending labs. Answered questions at this time.     9:02 PM Consult: Dr. Oscar discussed the case with Dr. Dodd (IR). Agrees with current management. Recommends admission and will see pt in consult for possible interventional radiology intervention.    9:03 PM - CONSULT: Dr. Oscar discussed the case with . Agrees with current management. Recommends admission and will see pt in hospital room.   Admitting Service: hospital medicine   Admitting Physician: Dr. Rivera   Admit to in tele      44 yo F who had a recent ankle fracture last week and in a walking boot (no surgical repair), presents to [facility] ER c/o sob, chest pressure, tachycardia today. in working pt up CTA shows bilateral PE. troponin: 1.275.  hr 120's satting 95-96% on RA.  discussing with Dr. Dodd (IR) to see if she is an IR candidate and after review of chart thinks she might be a candidate and wants to evaluate her asap in [place].  starting heparin and admission for bilateral PE, elevated troponin.          Medication List        ASK your doctor about these medications      levothyroxine 50 MCG tablet  Commonly known as: SYNTHROID  Take 1 tablet (50 mcg total) by mouth once daily.     naproxen 500 MG tablet  Commonly known as: NAPROSYN  Take 1 tablet (500 mg total) by mouth 2 (two) times daily with meals.     tamsulosin 0.4 mg Cap  Commonly known as: FLOMAX  Take 1 capsule (0.4 mg total) by mouth once daily.             Current Discharge Medication List            ED Diagnosis  1. SOB (shortness of breath)    2. Tachycardia    3. Elevated troponin    4. Acute pulmonary embolism, unspecified pulmonary embolism type, unspecified whether acute cor pulmonale present    5. Pulmonary embolism, bilateral                       Clinical Impression:  Final diagnoses:  [R00.0] Tachycardia  [R06.02] SOB (shortness of breath) (Primary)  [R79.89] Elevated troponin  [I26.99] Acute pulmonary embolism, unspecified pulmonary embolism type, unspecified whether acute cor pulmonale present  [I26.99] Pulmonary embolism, bilateral          ED Disposition Condition    Admit                       [1]   Social History  Tobacco Use    Smoking status: Never    Smokeless tobacco: Never   Substance Use Topics    Alcohol use: Not Currently    Drug use: Yes     Types: Marijuana        Magen Oscar Jr., MD  05/21/25 5024

## 2025-05-22 ENCOUNTER — ANESTHESIA EVENT (OUTPATIENT)
Dept: ANESTHESIOLOGY | Facility: HOSPITAL | Age: 45
End: 2025-05-22
Payer: COMMERCIAL

## 2025-05-22 ENCOUNTER — TELEPHONE (OUTPATIENT)
Dept: CARDIOLOGY | Facility: HOSPITAL | Age: 45
End: 2025-05-22
Payer: COMMERCIAL

## 2025-05-22 PROBLEM — I26.09 ACUTE PULMONARY EMBOLISM WITH ACUTE COR PULMONALE: Status: ACTIVE | Noted: 2025-05-22

## 2025-05-22 PROBLEM — S82.832A CLOSED FRACTURE OF DISTAL END OF LEFT FIBULA: Status: ACTIVE | Noted: 2025-05-22

## 2025-05-22 PROBLEM — K21.9 GERD (GASTROESOPHAGEAL REFLUX DISEASE): Status: ACTIVE | Noted: 2025-05-22

## 2025-05-22 PROBLEM — R79.89 ELEVATED TROPONIN: Status: ACTIVE | Noted: 2025-05-22

## 2025-05-22 PROBLEM — F41.1 GAD (GENERALIZED ANXIETY DISORDER): Status: ACTIVE | Noted: 2025-05-22

## 2025-05-22 LAB
25(OH)D3+25(OH)D2 SERPL-MCNC: 30 NG/ML (ref 30–96)
ABSOLUTE EOSINOPHIL (OHS): 0.04 K/UL
ABSOLUTE MONOCYTE (OHS): 0.68 K/UL (ref 0.3–1)
ABSOLUTE NEUTROPHIL COUNT (OHS): 9.78 K/UL (ref 1.8–7.7)
ANION GAP (OHS): 8 MMOL/L (ref 8–16)
AORTIC ROOT ANNULUS: 3.1 CM
AORTIC SIZE INDEX: 1.4 CM/M2
APTT PPP: 28.5 SECONDS (ref 21–32)
APTT PPP: >150 SECONDS (ref 21–32)
ASCENDING AORTA: 2.9 CM
AV INDEX (PROSTH): 0.81
AV MEAN GRADIENT: 6 MMHG
AV PEAK GRADIENT: 13 MMHG
AV VALVE AREA BY VELOCITY RATIO: 2.2 CM²
AV VALVE AREA: 2.3 CM²
AV VELOCITY RATIO: 0.78
BASOPHILS # BLD AUTO: 0.02 K/UL
BASOPHILS NFR BLD AUTO: 0.2 %
BSA FOR ECHO PROCEDURE: 2.09 M2
BUN SERPL-MCNC: 11 MG/DL (ref 6–20)
CALCIUM SERPL-MCNC: 8.5 MG/DL (ref 8.7–10.5)
CHLORIDE SERPL-SCNC: 111 MMOL/L (ref 95–110)
CO2 SERPL-SCNC: 21 MMOL/L (ref 23–29)
CREAT SERPL-MCNC: 0.7 MG/DL (ref 0.5–1.4)
CV ECHO LV RWT: 0.36 CM
DOP CALC AO PEAK VEL: 1.8 M/S
DOP CALC AO VTI: 28.4 CM
DOP CALC LVOT AREA: 2.8 CM2
DOP CALC LVOT DIAMETER: 1.9 CM
DOP CALC LVOT PEAK VEL: 1.4 M/S
DOP CALC LVOT STROKE VOLUME: 65.2 CM3
DOP CALC RVOT PEAK VEL: 0.95 M/S
DOP CALC RVOT VTI: 21.6 CM
DOP CALCLVOT PEAK VEL VTI: 23 CM
E WAVE DECELERATION TIME: 186 MSEC
E/A RATIO: 0.76
E/E' RATIO: 7 M/S
ECHO LV POSTERIOR WALL: 0.8 CM (ref 0.6–1.1)
EJECTION FRACTION: 60 %
ERYTHROCYTE [DISTWIDTH] IN BLOOD BY AUTOMATED COUNT: 13.2 % (ref 11.5–14.5)
FRACTIONAL SHORTENING: 36.4 % (ref 28–44)
GFR SERPLBLD CREATININE-BSD FMLA CKD-EPI: >60 ML/MIN/1.73/M2
GLUCOSE SERPL-MCNC: 116 MG/DL (ref 70–110)
HCT VFR BLD AUTO: 37.2 % (ref 37–48.5)
HCV AB SERPL QL IA: NEGATIVE
HGB BLD-MCNC: 11.8 GM/DL (ref 12–16)
HIV 1+2 AB+HIV1 P24 AG SERPL QL IA: NEGATIVE
IMM GRANULOCYTES # BLD AUTO: 0.06 K/UL (ref 0–0.04)
IMM GRANULOCYTES NFR BLD AUTO: 0.5 % (ref 0–0.5)
INTERVENTRICULAR SEPTUM: 0.7 CM (ref 0.6–1.1)
IVC DIAMETER: 2.83 CM
IVRT: 80 MSEC
LA MAJOR: 4.7 CM
LA MINOR: 4.5 CM
LA WIDTH: 2.7 CM
LEFT ATRIUM AREA SYSTOLIC (APICAL 2 CHAMBER): 11.74 CM2
LEFT ATRIUM AREA SYSTOLIC (APICAL 4 CHAMBER): 13.76 CM2
LEFT ATRIUM SIZE: 2.4 CM
LEFT ATRIUM VOLUME INDEX MOD: 14 ML/M2
LEFT ATRIUM VOLUME INDEX: 13 ML/M2
LEFT ATRIUM VOLUME MOD: 29 ML
LEFT ATRIUM VOLUME: 25 CM3
LEFT INTERNAL DIMENSION IN SYSTOLE: 2.8 CM (ref 2.1–4)
LEFT VENTRICLE DIASTOLIC VOLUME INDEX: 43.56 ML/M2
LEFT VENTRICLE DIASTOLIC VOLUME: 88 ML
LEFT VENTRICLE END SYSTOLIC VOLUME APICAL 2 CHAMBER: 25.27 ML
LEFT VENTRICLE END SYSTOLIC VOLUME APICAL 4 CHAMBER: 32.99 ML
LEFT VENTRICLE MASS INDEX: 49.8 G/M2
LEFT VENTRICLE SYSTOLIC VOLUME INDEX: 14.4 ML/M2
LEFT VENTRICLE SYSTOLIC VOLUME: 29 ML
LEFT VENTRICULAR INTERNAL DIMENSION IN DIASTOLE: 4.4 CM (ref 3.5–6)
LEFT VENTRICULAR MASS: 100.6 G
LV LATERAL E/E' RATIO: 7 M/S
LV SEPTAL E/E' RATIO: 7 M/S
LVED V (TEICH): 87.93 ML
LVES V (TEICH): 28.83 ML
LVOT MG: 4.41 MMHG
LVOT MV: 0.99 CM/S
LYMPHOCYTES # BLD AUTO: 2.1 K/UL (ref 1–4.8)
MCH RBC QN AUTO: 28.3 PG (ref 27–31)
MCHC RBC AUTO-ENTMCNC: 31.7 G/DL (ref 32–36)
MCV RBC AUTO: 89 FL (ref 82–98)
MV PEAK A VEL: 1.11 M/S
MV PEAK E VEL: 0.84 M/S
MV STENOSIS PRESSURE HALF TIME: 54.04 MS
MV VALVE AREA P 1/2 METHOD: 4.07 CM2
NUCLEATED RBC (/100WBC) (OHS): 0 /100 WBC
PISA TR MAX VEL: 2.4 M/S
PLATELET # BLD AUTO: 266 K/UL (ref 150–450)
PMV BLD AUTO: 11 FL (ref 9.2–12.9)
POC ACTIVATED CLOTTING TIME K: 130 SEC (ref 74–137)
POC ACTIVATED CLOTTING TIME K: 193 SEC (ref 74–137)
POC ACTIVATED CLOTTING TIME K: 199 SEC (ref 74–137)
POC ACTIVATED CLOTTING TIME K: 262 SEC (ref 74–137)
POC ACTIVATED CLOTTING TIME K: 331 SEC (ref 74–137)
POC ACTIVATED CLOTTING TIME K: 395 SEC (ref 74–137)
POTASSIUM SERPL-SCNC: 4.1 MMOL/L (ref 3.5–5.1)
PV MEAN GRADIENT: 3 MMHG
RA MAJOR: 3.91 CM
RA PRESSURE ESTIMATED: 3 MMHG
RA WIDTH: 3.5 CM
RBC # BLD AUTO: 4.17 M/UL (ref 4–5.4)
RELATIVE EOSINOPHIL (OHS): 0.3 %
RELATIVE LYMPHOCYTE (OHS): 16.6 % (ref 18–48)
RELATIVE MONOCYTE (OHS): 5.4 % (ref 4–15)
RELATIVE NEUTROPHIL (OHS): 77 % (ref 38–73)
RIGHT VENTRICLE DIASTOLIC MID DIMENSION: 3.7 CM
RV MID DIAMA: 3.68 CM
RV TB RVSP: 5 MMHG
SAMPLE: ABNORMAL
SAMPLE: NORMAL
SODIUM SERPL-SCNC: 140 MMOL/L (ref 136–145)
STJ: 2.8 CM
TDI LATERAL: 0.12 M/S
TDI SEPTAL: 0.12 M/S
TDI: 0.12 M/S
TR MAX PG: 23 MMHG
TRICUSPID ANNULAR PLANE SYSTOLIC EXCURSION: 1.9 CM
TROPONIN I SERPL DL<=0.01 NG/ML-MCNC: 0.51 NG/ML
TROPONIN I SERPL DL<=0.01 NG/ML-MCNC: 0.86 NG/ML
TV REST PULMONARY ARTERY PRESSURE: 26 MMHG
WBC # BLD AUTO: 12.68 K/UL (ref 3.9–12.7)
Z-SCORE OF LEFT VENTRICULAR DIMENSION IN END DIASTOLE: -3.02
Z-SCORE OF LEFT VENTRICULAR DIMENSION IN END SYSTOLE: -2.1

## 2025-05-22 PROCEDURE — 36415 COLL VENOUS BLD VENIPUNCTURE: CPT | Performed by: FAMILY MEDICINE

## 2025-05-22 PROCEDURE — 63600175 PHARM REV CODE 636 W HCPCS: Performed by: STUDENT IN AN ORGANIZED HEALTH CARE EDUCATION/TRAINING PROGRAM

## 2025-05-22 PROCEDURE — 25000003 PHARM REV CODE 250: Performed by: FAMILY MEDICINE

## 2025-05-22 PROCEDURE — 25000003 PHARM REV CODE 250: Performed by: STUDENT IN AN ORGANIZED HEALTH CARE EDUCATION/TRAINING PROGRAM

## 2025-05-22 PROCEDURE — 21400001 HC TELEMETRY ROOM

## 2025-05-22 PROCEDURE — 36415 COLL VENOUS BLD VENIPUNCTURE: CPT | Performed by: EMERGENCY MEDICINE

## 2025-05-22 PROCEDURE — 97161 PT EVAL LOW COMPLEX 20 MIN: CPT

## 2025-05-22 PROCEDURE — 02CR3ZZ EXTIRPATION OF MATTER FROM LEFT PULMONARY ARTERY, PERCUTANEOUS APPROACH: ICD-10-PCS | Performed by: STUDENT IN AN ORGANIZED HEALTH CARE EDUCATION/TRAINING PROGRAM

## 2025-05-22 PROCEDURE — 94761 N-INVAS EAR/PLS OXIMETRY MLT: CPT

## 2025-05-22 PROCEDURE — 85025 COMPLETE CBC W/AUTO DIFF WBC: CPT | Performed by: FAMILY MEDICINE

## 2025-05-22 PROCEDURE — 85730 THROMBOPLASTIN TIME PARTIAL: CPT | Performed by: STUDENT IN AN ORGANIZED HEALTH CARE EDUCATION/TRAINING PROGRAM

## 2025-05-22 PROCEDURE — 99222 1ST HOSP IP/OBS MODERATE 55: CPT | Mod: 25,,,

## 2025-05-22 PROCEDURE — 80048 BASIC METABOLIC PNL TOTAL CA: CPT | Performed by: FAMILY MEDICINE

## 2025-05-22 PROCEDURE — 84484 ASSAY OF TROPONIN QUANT: CPT | Performed by: FAMILY MEDICINE

## 2025-05-22 PROCEDURE — 63600175 PHARM REV CODE 636 W HCPCS: Performed by: FAMILY MEDICINE

## 2025-05-22 PROCEDURE — 85730 THROMBOPLASTIN TIME PARTIAL: CPT | Performed by: EMERGENCY MEDICINE

## 2025-05-22 PROCEDURE — 63600175 PHARM REV CODE 636 W HCPCS: Performed by: EMERGENCY MEDICINE

## 2025-05-22 RX ORDER — FENTANYL CITRATE 50 UG/ML
INJECTION, SOLUTION INTRAMUSCULAR; INTRAVENOUS
Status: DISCONTINUED | OUTPATIENT
Start: 2025-05-22 | End: 2025-05-22

## 2025-05-22 RX ORDER — LEVOTHYROXINE SODIUM 50 UG/1
50 TABLET ORAL
Status: DISCONTINUED | OUTPATIENT
Start: 2025-05-22 | End: 2025-05-23 | Stop reason: HOSPADM

## 2025-05-22 RX ORDER — LIDOCAINE HYDROCHLORIDE 20 MG/ML
INJECTION, SOLUTION INFILTRATION; PERINEURAL CODE/TRAUMA/SEDATION MEDICATION
Status: COMPLETED | OUTPATIENT
Start: 2025-05-22 | End: 2025-05-22

## 2025-05-22 RX ORDER — ALPRAZOLAM 0.25 MG/1
0.25 TABLET ORAL 2 TIMES DAILY PRN
Status: DISCONTINUED | OUTPATIENT
Start: 2025-05-22 | End: 2025-05-23 | Stop reason: HOSPADM

## 2025-05-22 RX ORDER — ONDANSETRON 4 MG/1
4 TABLET, ORALLY DISINTEGRATING ORAL EVERY 6 HOURS PRN
Status: DISCONTINUED | OUTPATIENT
Start: 2025-05-22 | End: 2025-05-23 | Stop reason: HOSPADM

## 2025-05-22 RX ORDER — KETOROLAC TROMETHAMINE 30 MG/ML
30 INJECTION, SOLUTION INTRAMUSCULAR; INTRAVENOUS EVERY 6 HOURS PRN
Status: DISCONTINUED | OUTPATIENT
Start: 2025-05-22 | End: 2025-05-23 | Stop reason: HOSPADM

## 2025-05-22 RX ORDER — HEPARIN SODIUM 1000 [USP'U]/ML
INJECTION, SOLUTION INTRAVENOUS; SUBCUTANEOUS
Status: DISCONTINUED | OUTPATIENT
Start: 2025-05-22 | End: 2025-05-22

## 2025-05-22 RX ORDER — TAMSULOSIN HYDROCHLORIDE 0.4 MG/1
0.4 CAPSULE ORAL DAILY
Status: DISCONTINUED | OUTPATIENT
Start: 2025-05-22 | End: 2025-05-22

## 2025-05-22 RX ORDER — MIDAZOLAM HYDROCHLORIDE 1 MG/ML
INJECTION INTRAMUSCULAR; INTRAVENOUS
Status: DISCONTINUED | OUTPATIENT
Start: 2025-05-22 | End: 2025-05-22

## 2025-05-22 RX ORDER — CALCIUM CARBONATE 200(500)MG
1000 TABLET,CHEWABLE ORAL DAILY
Status: DISCONTINUED | OUTPATIENT
Start: 2025-05-22 | End: 2025-05-23 | Stop reason: HOSPADM

## 2025-05-22 RX ORDER — PANTOPRAZOLE SODIUM 40 MG/1
40 TABLET, DELAYED RELEASE ORAL DAILY
Status: DISCONTINUED | OUTPATIENT
Start: 2025-05-22 | End: 2025-05-23 | Stop reason: HOSPADM

## 2025-05-22 RX ORDER — TRAMADOL HYDROCHLORIDE 50 MG/1
50 TABLET, FILM COATED ORAL EVERY 6 HOURS PRN
Status: DISCONTINUED | OUTPATIENT
Start: 2025-05-22 | End: 2025-05-23 | Stop reason: HOSPADM

## 2025-05-22 RX ORDER — ACETAMINOPHEN 325 MG/1
650 TABLET ORAL EVERY 6 HOURS PRN
Status: DISCONTINUED | OUTPATIENT
Start: 2025-05-22 | End: 2025-05-23 | Stop reason: HOSPADM

## 2025-05-22 RX ORDER — ACETAMINOPHEN 500 MG
5000 TABLET ORAL DAILY
Status: DISCONTINUED | OUTPATIENT
Start: 2025-05-22 | End: 2025-05-23 | Stop reason: HOSPADM

## 2025-05-22 RX ADMIN — HEPARIN SODIUM 18 UNITS/KG/HR: 10000 INJECTION, SOLUTION INTRAVENOUS at 06:05

## 2025-05-22 RX ADMIN — MIDAZOLAM HYDROCHLORIDE 2 MG: 1 INJECTION, SOLUTION INTRAMUSCULAR; INTRAVENOUS at 12:05

## 2025-05-22 RX ADMIN — HEPARIN SODIUM 5000 UNITS: 1000 INJECTION, SOLUTION INTRAVENOUS; SUBCUTANEOUS at 01:05

## 2025-05-22 RX ADMIN — HEPARIN SODIUM 10000 UNITS: 1000 INJECTION, SOLUTION INTRAVENOUS; SUBCUTANEOUS at 01:05

## 2025-05-22 RX ADMIN — LIDOCAINE HYDROCHLORIDE 10 ML: 20 INJECTION, SOLUTION INFILTRATION; PERINEURAL at 12:05

## 2025-05-22 RX ADMIN — ACETAMINOPHEN 650 MG: 325 TABLET ORAL at 02:05

## 2025-05-22 RX ADMIN — APIXABAN 10 MG: 2.5 TABLET, FILM COATED ORAL at 02:05

## 2025-05-22 RX ADMIN — SODIUM CHLORIDE, SODIUM LACTATE, POTASSIUM CHLORIDE, AND CALCIUM CHLORIDE: .6; .31; .03; .02 INJECTION, SOLUTION INTRAVENOUS at 12:05

## 2025-05-22 RX ADMIN — CALCIUM CARBONATE (ANTACID) CHEW TAB 500 MG 1000 MG: 500 CHEW TAB at 09:05

## 2025-05-22 RX ADMIN — LEVOTHYROXINE SODIUM 50 MCG: 0.05 TABLET ORAL at 06:05

## 2025-05-22 RX ADMIN — MIDAZOLAM HYDROCHLORIDE 2 MG: 1 INJECTION, SOLUTION INTRAMUSCULAR; INTRAVENOUS at 01:05

## 2025-05-22 RX ADMIN — FENTANYL CITRATE 100 MCG: 50 INJECTION, SOLUTION INTRAMUSCULAR; INTRAVENOUS at 12:05

## 2025-05-22 RX ADMIN — ACETAMINOPHEN 650 MG: 325 TABLET ORAL at 09:05

## 2025-05-22 RX ADMIN — CHOLECALCIFEROL TAB 125 MCG (5000 UNIT) 5000 UNITS: 125 TAB at 09:05

## 2025-05-22 RX ADMIN — PANTOPRAZOLE SODIUM 40 MG: 40 TABLET, DELAYED RELEASE ORAL at 09:05

## 2025-05-22 RX ADMIN — APIXABAN 10 MG: 2.5 TABLET, FILM COATED ORAL at 08:05

## 2025-05-22 NOTE — CONSULTS
[facility] ([facility])  Cardiology  Consult Note    Patient Name: Chandni Wu  MRN: [id number]  Admission Date: 5/21/2025  Hospital Length of Stay: 1 days  Code Status: Full Code   Attending Provider: Latanya Sagastume MD   Consulting Provider: Jaida Steinberg PA-C  Primary Care Physician: Misael Bowling MD  Principal Problem:Acute pulmonary embolism with acute cor pulmonale    Patient information was obtained from patient, past medical records, and ER records.     Inpatient consult to Cardiology  Consult performed by: Jaida Steinberg PA-C  Consult ordered by: Latanya Sagastume MD        Subjective:     Chief Complaint:  elevated troponin, shortness of breath     HPI:   Patient is a 45-year-old  female with a PMH of Crohn's disease, GERD, hypothyroidism, DENNIS, cholelithiasis who presents to [facility] ED with shortness a breath.  Of note patient recently sustained distal fibula fracture approximately 2 days ago.  She followed up with Orthopedic surgery and was placed in walking boot with weight-bearing as tolerated.  Shortness of breath has progressively worsened.  Associated symptoms include palpitations.  Denies any lower extremity swelling. Patient states she has been ambulating with a knee scooter and weight bears as tolerated. Denies any oral contraceptive use or prior hx of blood clots.      In the ED, heart rate 128 O2 saturation 96% on room air.  WBC 13.3k, D-dimer 2.26, troponin 1.275.  CTA chest showed bilateral pulmonary embolism involving the main pulmonary arteries extending into segmental and lobar branches of all lobes of the lung. Right ventricle strain and hypertrophy is present.  Case was discussed with interventional radiology who recommended initiating heparin drip.  Patient was taken immediately for thrombectomy upon arrival to [facility].     Cardiology consulted for elevated troponin. Resting comfortably in bed today. Has now undergone thrombectomy. She is no longer complaining  of shortness of breath or her heart racing. No chest pain. She is still feeling some soreness in her right shoulder. She is wearing her walking boot on the left foot. Echo pending. Troponin trending down 1.275--> 0.861.     Past Medical History:   Diagnosis Date    Crohn's colitis        Past Surgical History:   Procedure Laterality Date    INTRALUMINAL GASTROINTESTINAL TRACT IMAGING VIA CAPSULE N/A 7/28/2021    Procedure: IMAGING PROCEDURE, GI TRACT, INTRALUMINAL, VIA CAPSULE;  Surgeon: Misbah Menendez RN;  Location: [facility];  Service: Endoscopy;  Laterality: N/A;       Review of patient's allergies indicates:   Allergen Reactions    Wool     Adhesive Rash    Clarithromycin Nausea And Vomiting and Other (See Comments)     GI INTOL    Clindamycin Nausea And Vomiting and Other (See Comments)       No current facility-administered medications on file prior to encounter.     Current Outpatient Medications on File Prior to Encounter   Medication Sig    levothyroxine (SYNTHROID) 50 MCG tablet Take 1 tablet (50 mcg total) by mouth once daily.    naproxen (NAPROSYN) 500 MG tablet Take 1 tablet (500 mg total) by mouth 2 (two) times daily with meals.    tamsulosin (FLOMAX) 0.4 mg Cap Take 1 capsule (0.4 mg total) by mouth once daily.     Family History       Problem Relation (Age of Onset)    Breast cancer Maternal Aunt    Hypertension Father, Maternal Grandmother, Maternal Grandfather          Tobacco Use    Smoking status: Never    Smokeless tobacco: Never   Substance and Sexual Activity    Alcohol use: Not Currently    Drug use: Yes     Types: Marijuana    Sexual activity: Yes     Partners: Male     Birth control/protection: Partner-Vasectomy     Review of Systems   Constitutional: Negative.   HENT: Negative.     Eyes: Negative.    Cardiovascular: Negative.  Negative for chest pain, dyspnea on exertion, irregular heartbeat, leg swelling and palpitations.   Respiratory: Negative.     Skin: Negative.    Musculoskeletal:   Positive for joint pain (right shoulder pain).   Gastrointestinal: Negative.    Genitourinary: Negative.    Neurological: Negative.    Psychiatric/Behavioral: Negative.       Objective:     Vital Signs (Most Recent):  Temp: 97.9 °F (36.6 °C) (05/22/25 0748)  Pulse: 101 (05/22/25 0844)  Resp: 17 (05/22/25 0748)  BP: 123/78 (05/22/25 0844)  SpO2: (!) 93 % (05/22/25 0748) Vital Signs (24h Range):  Temp:  [97.9 °F (36.6 °C)-98.3 °F (36.8 °C)] 97.9 °F (36.6 °C)  Pulse:  [] 101  Resp:  [16-20] 17  SpO2:  [93 %-100 %] 93 %  BP: (123-144)/(76-93) 123/78     Weight: 95.3 kg (210 lb)  Body mass index is 34.95 kg/m².    SpO2: (!) 93 %         Intake/Output Summary (Last 24 hours) at 5/22/2025 1025  Last data filed at 5/22/2025 0404  Gross per 24 hour   Intake 500 ml   Output --   Net 500 ml       Lines/Drains/Airways       Peripheral Intravenous Line  Duration                  Peripheral IV - Single Lumen 05/21/25 1815 20 G 1 in Anterior;Left;Proximal Forearm <1 day                     Physical Exam  Vitals and nursing note reviewed.   Constitutional:       Appearance: Normal appearance.   HENT:      Head: Normocephalic.   Eyes:      Pupils: Pupils are equal, round, and reactive to light.   Cardiovascular:      Rate and Rhythm: Normal rate and regular rhythm.      Heart sounds: Normal heart sounds, S1 normal and S2 normal. No murmur heard.     No S3 or S4 sounds.   Pulmonary:      Effort: Pulmonary effort is normal.      Breath sounds: Normal breath sounds.   Abdominal:      General: Bowel sounds are normal.      Palpations: Abdomen is soft.   Musculoskeletal:      Cervical back: Normal range of motion.      Right lower leg: No edema.      Left lower leg: No edema.      Comments: Wearing walking boot on left foot   Skin:     Capillary Refill: Capillary refill takes less than 2 seconds.   Neurological:      General: No focal deficit present.      Mental Status: She is alert and oriented to person, place, and time.  "  Psychiatric:         Mood and Affect: Mood normal.         Behavior: Behavior normal.         Thought Content: Thought content normal.          Significant Labs: CMP   Recent Labs   Lab 05/21/25  1833 05/22/25  0540    140   K 3.5 4.1    111*   CO2 21* 21*   * 116*   BUN 14 11   CREATININE 1.0 0.7   CALCIUM 9.5 8.5*   PROT 7.8  --    ALBUMIN 3.9  --    BILITOT 0.3  --    ALKPHOS 104  --    AST 23  --    ALT 21  --    ANIONGAP 14 8   , CBC   Recent Labs   Lab 05/21/25  1833 05/22/25  0540   WBC 13.31* 12.68   HGB 13.5 11.8*   HCT 40.7 37.2    266   , Troponin No results for input(s): "TROPONINIHS" in the last 48 hours., and All pertinent lab results from the last 24 hours have been reviewed.    Significant Imaging: CTA chest, echo pending  Assessment and Plan:     * Acute pulmonary embolism with acute cor pulmonale  S/p thrombectomy   Needs anti coag upon DC       Elevated troponin  Troponin trending down 1.275-->0.861, likely demand ischemia due to PE   Echo pending   Recommend outpatient stress test           VTE Risk Mitigation (From admission, onward)           Ordered     heparin 25,000 units in dextrose 5% (100 units/ml) IV bolus from bag HIGH INTENSITY nomogram - [facility]  As needed (PRN)        Question:  Heparin Infusion Adjustment (DO NOT MODIFY ANSWER)  Answer:  [email or web address]\epic\Images\Pharmacy\HeparinInfusions\heparin HIGH INTENSITY nomogram for [facility] [id number].pdf    05/21/25 2014     heparin 25,000 units in dextrose 5% (100 units/ml) IV bolus from bag HIGH INTENSITY nomogram - [facility]  As needed (PRN)        Question:  Heparin Infusion Adjustment (DO NOT MODIFY ANSWER)  Answer:  \\[email or web address]\epic\Images\Pharmacy\HeparinInfusions\heparin HIGH INTENSITY nomogram for [facility] [id number].pdf    05/21/25 2014     heparin 25,000 units in dextrose 5% 250 mL (100 units/mL) infusion HIGH INTENSITY nomogram - [facility]  Continuous        Question:  Begin at (units/kg/hr)  Answer:  18    05/21/25 2014         "            Thank you for your consult. I will follow-up with patient. Please contact us if you have any additional questions.    Jaida Steinberg PA-C  Cardiology   [facility] Tele[facility] ([facility])

## 2025-05-22 NOTE — CONSULTS
Chart reviewed by Dr. Dodd.       ASSESSMENT/PLAN:    PE    The order for a thrombectomy has been placed and the procedure has been performed.  Please re consult if further intervention by radiology is needed.          Thank you for the consult.

## 2025-05-22 NOTE — NURSING
Dr. Sagastume informed pt's aPTT was greater than 150, heparin drip on hold per nomogram orders. Dr. Sagastume also informed that Josiane in IR notified of small amount (nickel size, less than a quarter size) of blood at the groin site this morning after getting up to bedside commode to void, site marked with a permanent marker. No active blood drainage noted from the site. Josiane and IR PA are coming up to assess the site and change the dressing. Lab called with aPTT result after, heparin drip held and rate decreased per orders. Pt also informed to notify nurse if any bleeding is noted or draining blood felt at the site and verbalized an understanding, spouse at the bedside.  
Heparin drip stopped per MD orders and apixaban started at this time. Pt educated on new medication, s/s of adverse reaction, and when to notify nurse.  
Pt's new right groin dressing assessed and is clean, dry, and intact. Pt educated on s/s of bleeding and to notify nurse and verbalized an understanding. Heparin restarted at this time with rate decreased per heparin nomogram orders at this time.  
Right Hand/Right Foot

## 2025-05-22 NOTE — SEDATION DOCUMENTATION
Procedure completed at this time. Flowstasis device deployed by Dr. Dodd. Pressure being held by Reese PERES. Report given to Karen Izquierdo RN.

## 2025-05-22 NOTE — TRANSFER OF CARE
"Anesthesia Transfer of Care Note    Patient: Chandni Wu    Procedure(s) Performed: Procedure(s) (LRB):  Pulmonary Embolism Thrombectomy (N/A)    Patient location: Cath Lab    Anesthesia Type: MAC    Transport from OR: Transported from OR on room air with adequate spontaneous ventilation    Post pain: adequate analgesia    Post assessment: no apparent anesthetic complications    Post vital signs: stable    Level of consciousness: awake and responds to stimulation    Nausea/Vomiting: no nausea/vomiting    Complications: none    Transfer of care protocol was followed      Last vitals: Visit Vitals  BP (!) 139/93   Pulse (!) 115   Temp 36.8 °C (98.3 °F) (Oral)   Resp 18   Ht 5' 2" (1.575 m)   Wt 95.3 kg (210 lb)   LMP 11/01/2024 (Approximate)   SpO2 98%   BMI 38.41 kg/m²     "

## 2025-05-22 NOTE — ASSESSMENT & PLAN NOTE
Followed up outpatient with ortho   WBAT for 8 weeks per recs  Supplemental vitamin D and Ca  Will check vitamin D level per recs   Toradol p.r.n.

## 2025-05-22 NOTE — H&P
"[facility] Medicine  History & Physical    Patient Name: Chandni Wu  MRN: [id number]  Patient Class: IP- Inpatient  Admission Date: 5/21/2025  Attending Physician: Thomas Rivera MD  Primary Care Provider: Misael Bowling MD         Patient information was obtained from patient and ER records.     Subjective:     Principal Problem:Acute pulmonary embolism with acute cor pulmonale    Chief Complaint:   Chief Complaint   Patient presents with    Shortness of Breath     That began around lunch. Pt reports " my heart is racing." Feels tightness to chest. Pt reports falling on Monday. Broke left leg to distal fibula.        HPI: Patient is a 45-year-old  female with a PMH of Crohn's disease, GERD, hypothyroidism, DENNIS, cholelithiasis who presents to [facility] ED with shortness a breath.  Of note patient recently sustained distal fibula fracture approximately 2 days ago.  She followed up with Orthopedic surgery and was placed in walking boot with weight-bearing as tolerated.  Shortness a breath has progressively worsened.  Associated symptoms include palpitations.  Denies any lower extremity swelling. Patient states she has been ambulating with a knee scooter and weight bears as tolerated. Denies any oral contraceptive use or prior hx of blood clots.     In the ED, heart rate 128 O2 saturation 96% on room air.  WBC 13.3k, D-dimer 2.26, troponin 1.275.  CTA chest showed bilateral pulmonary embolism involving the main pulmonary arteries extending into segmental and lobar branches of all lobes of the lung. Right ventricle strain and hypertrophy is present.  Case was discussed with interventional radiology who recommended initiating heparin drip.  Patient was taken immediately for thrombectomy upon arrival to [facility].         Past Medical History:   Diagnosis Date    Crohn's colitis        Past Surgical History:   Procedure Laterality Date    INTRALUMINAL GASTROINTESTINAL TRACT " IMAGING VIA CAPSULE N/A 7/28/2021    Procedure: IMAGING PROCEDURE, GI TRACT, INTRALUMINAL, VIA CAPSULE;  Surgeon: Misbah Menendez RN;  Location: [facility];  Service: Endoscopy;  Laterality: N/A;       Review of patient's allergies indicates:   Allergen Reactions    Wool     Adhesive Rash    Clarithromycin Nausea And Vomiting and Other (See Comments)     GI INTOL    Clindamycin Nausea And Vomiting and Other (See Comments)       No current facility-administered medications on file prior to encounter.     Current Outpatient Medications on File Prior to Encounter   Medication Sig    levothyroxine (SYNTHROID) 50 MCG tablet Take 1 tablet (50 mcg total) by mouth once daily.    naproxen (NAPROSYN) 500 MG tablet Take 1 tablet (500 mg total) by mouth 2 (two) times daily with meals.    tamsulosin (FLOMAX) 0.4 mg Cap Take 1 capsule (0.4 mg total) by mouth once daily.     Family History       Problem Relation (Age of Onset)    Breast cancer Maternal Aunt    Hypertension Father, Maternal Grandmother, Maternal Grandfather          Tobacco Use    Smoking status: Never    Smokeless tobacco: Never   Substance and Sexual Activity    Alcohol use: Not Currently    Drug use: Yes     Types: Marijuana    Sexual activity: Yes     Partners: Male     Birth control/protection: Partner-Vasectomy     Review of Systems   Constitutional:  Negative for fatigue and fever.   HENT:  Negative for sinus pressure.    Eyes:  Negative for visual disturbance.   Respiratory:  Positive for shortness of breath.    Cardiovascular:  Positive for palpitations. Negative for chest pain.   Gastrointestinal:  Negative for nausea and vomiting.   Genitourinary:  Negative for difficulty urinating.   Musculoskeletal:  Negative for back pain.   Skin:  Negative for rash.   Neurological:  Negative for headaches.   Psychiatric/Behavioral:  Negative for confusion.      Objective:     Vital Signs (Most Recent):  Temp: 98.3 °F (36.8 °C) (05/21/25 1720)  Pulse: (!) 115 (05/21/25  2216)  Resp: 18 (05/21/25 2208)  BP: (!) 139/93 (05/21/25 2216)  SpO2: 98 % (05/21/25 2216) Vital Signs (24h Range):  Temp:  [98.3 °F (36.8 °C)] 98.3 °F (36.8 °C)  Pulse:  [115-128] 115  Resp:  [18-19] 18  SpO2:  [96 %-98 %] 98 %  BP: (127-144)/(79-93) 139/93     Weight: 95.3 kg (210 lb)  Body mass index is 38.41 kg/m².     Physical Exam  Constitutional:       General: She is not in acute distress.     Appearance: She is well-developed. She is not diaphoretic.   HENT:      Head: Normocephalic and atraumatic.   Eyes:      Pupils: Pupils are equal, round, and reactive to light.   Cardiovascular:      Rate and Rhythm: Regular rhythm. Tachycardia present.      Heart sounds: Normal heart sounds. No murmur heard.     No friction rub. No gallop.   Pulmonary:      Effort: Pulmonary effort is normal. No respiratory distress.      Breath sounds: Normal breath sounds. No stridor. No wheezing or rales.   Abdominal:      General: Bowel sounds are normal. There is no distension.      Palpations: Abdomen is soft. There is no mass.      Tenderness: There is no abdominal tenderness. There is no guarding.   Skin:     General: Skin is warm.      Findings: No erythema.   Neurological:      Mental Status: She is alert and oriented to person, place, and time.              CRANIAL NERVES     CN III, IV, VI   Pupils are equal, round, and reactive to light.       Significant Labs:   Results for orders placed or performed during the hospital encounter of 05/21/25   Hepatitis C Antibody    Collection Time: 05/21/25  6:33 PM   Result Value Ref Range    Hep C Ab Interp Negative Negative   HCV Virus Hold Specimen    Collection Time: 05/21/25  6:33 PM   Result Value Ref Range    Extra Tube Hold for add-ons.    HIV 1/2 Ag/Ab (4th Gen)    Collection Time: 05/21/25  6:33 PM   Result Value Ref Range    HIV 1/2 Ag/Ab Negative Negative   D dimer, quantitative    Collection Time: 05/21/25  6:33 PM   Result Value Ref Range    D-Dimer 2.26 (H) <0.50 mg/L  FEU   TSH    Collection Time: 05/21/25  6:33 PM   Result Value Ref Range    TSH 1.992 0.400 - 4.000 uIU/mL   Troponin I    Collection Time: 05/21/25  6:33 PM   Result Value Ref Range    Troponin-I 1.275 (H) <=0.026 ng/mL   Brain natriuretic peptide    Collection Time: 05/21/25  6:33 PM   Result Value Ref Range    BNP 45 0 - 99 pg/mL   Comprehensive metabolic panel    Collection Time: 05/21/25  6:33 PM   Result Value Ref Range    Sodium 141 136 - 145 mmol/L    Potassium 3.5 3.5 - 5.1 mmol/L    Chloride 106 95 - 110 mmol/L    CO2 21 (L) 23 - 29 mmol/L    Glucose 114 (H) 70 - 110 mg/dL    BUN 14 6 - 20 mg/dL    Creatinine 1.0 0.5 - 1.4 mg/dL    Calcium 9.5 8.7 - 10.5 mg/dL    Protein Total 7.8 6.0 - 8.4 gm/dL    Albumin 3.9 3.5 - 5.2 g/dL    Bilirubin Total 0.3 0.1 - 1.0 mg/dL     40 - 150 unit/L    AST 23 11 - 45 unit/L    ALT 21 10 - 44 unit/L    Anion Gap 14 8 - 16 mmol/L    eGFR >60 >60 mL/min/1.73/m2   CBC with Differential    Collection Time: 05/21/25  6:33 PM   Result Value Ref Range    WBC 13.31 (H) 3.90 - 12.70 K/uL    RBC 4.74 4.00 - 5.40 M/uL    HGB 13.5 12.0 - 16.0 gm/dL    HCT 40.7 37.0 - 48.5 %    MCV 86 82 - 98 fL    MCH 28.5 27.0 - 31.0 pg    MCHC 33.2 32.0 - 36.0 g/dL    RDW 13.0 11.5 - 14.5 %    Platelet Count 296 150 - 450 K/uL    MPV 10.6 9.2 - 12.9 fL    Nucleated RBC 0 <=0 /100 WBC    Neut % 77.4 (H) 38 - 73 %    Lymph % 15.3 (L) 18 - 48 %    Mono % 6.1 4 - 15 %    Eos % 0.7 <=8 %    Basophil % 0.2 <=1.9 %    Imm Grans % 0.3 0.0 - 0.5 %    Neut # 10.30 (H) 1.8 - 7.7 K/uL    Lymph # 2.04 1 - 4.8 K/uL    Mono # 0.81 0.3 - 1 K/uL    Eos # 0.09 <=0.5 K/uL    Baso # 0.03 <=0.2 K/uL    Imm Grans # 0.04 0.00 - 0.04 K/uL   APTT    Collection Time: 05/21/25  6:33 PM   Result Value Ref Range    PTT 25.7 21.0 - 32.0 seconds   Protime-INR    Collection Time: 05/21/25  6:33 PM   Result Value Ref Range    PT 10.7 9.0 - 12.5 seconds    INR 0.9 0.8 - 1.2   POCT COVID-19 Rapid Screening    Collection Time:  05/21/25  8:23 PM   Result Value Ref Range    POC Rapid COVID Negative Negative     Acceptable Yes    POCT Influenza A/B Molecular    Collection Time: 05/21/25  8:23 PM   Result Value Ref Range    POC Molecular Influenza A Ag Negative Negative    POC Molecular Influenza B Ag Negative Negative     Acceptable Yes         Significant Imaging:   Imaging Results               CTA Chest Non-Coronary (PE Studies) (Final result)  Result time 05/21/25 19:59:41      Final result by Charles LopezFranki), MD (05/21/25 19:59:41)                   Impression:      Bilateral pulmonary embolism involving the main pulmonary arteries extending into segmental and lobar branches of all lobes of the lung.  Right ventricle strain and hypertrophy is present.  No evidence of pulmonary infarction.    This report was flagged in Epic as abnormal.    All CT scans at this facility use dose modulation, iterative reconstruction and/or weight based dosing when appropriate to reduce radiation dose to as low as reasonably achievable.      Electronically signed by: Charles Lopez MD  Date:    05/21/2025  Time:    19:59               Narrative:    EXAMINATION:  CTA CHEST NON CORONARY (PE STUDIES)    CLINICAL HISTORY:  Pulmonary embolism (PE) suspected, high prob;Pulmonary embolism (PE) suspected, positive D-dimer;    TECHNIQUE:  Standard CTA of the chest performed with 100 cc Omnipaque 350 utilizing 3-D MIP reformations.    COMPARISON:  None    FINDINGS:  Heart is normal in size.  However there is evidence of right ventricle enlargement with bulging of the intraventricular septum into the left ventricle.  Findings are compatible with right ventricle strain and hypertrophy.    No coronary artery calcifications.  No pericardial effusions    There is pulmonary embolism involving the main left pulmonary artery extending into the lobar and segmental branches of the left upper lobe and lower lobe.  There is also pulmonary  embolism involving the right main pulmonary artery extending into lobar and segmental branches of the right upper middle and lower lobes.    The lungs appear clear.  No evidence of infarction.  No evidence of pleural effusion or pneumothorax.    No evidence of mediastinal hilar adenopathy.                                       X-Ray Chest AP Portable (Final result)  Result time 05/21/25 18:35:08      Final result by Charles Lopez MD (Timothy) (05/21/25 18:35:08)                   Impression:     Normal chest.    Finalized on: 5/21/2025 6:35 PM By:  Crescencio Lopez MD  [facility]# [id number]      2025-05-21 18:37:[id number]     [facility]               Narrative:    EXAM: XR CHEST AP PORTABLE    CLINICAL HISTORY: Shortness of breath    FINDINGS:  Heart is normal in size.  Lungs are clear.    One view.                                        Assessment/Plan:     Assessment & Plan  Acute pulmonary embolism with acute cor pulmonale  Bilateral PE with RV strain on CTA   PE severity index class II  Elevated troponins  Indicative of intermediate-high early mortality risk   IR consult on case   Empiric heparin   Thrombectomy   Appears to be unprovoked  No history of prior VTE   Obtain echo  Possible need for outpatient heme eval for hypercoagulable workup          Hypothyroidism  Synthroid    Closed fracture of distal end of left fibula  Followed up outpatient with ortho   WBAT for 8 weeks per recs  Supplemental vitamin D and Ca  Will check vitamin D level per recs   Toradol p.r.n.      Elevated troponin  Likely secondary to RV strain from PE   EKG negative for ST changes or LBBB   Obtain echo   Trend troponins  Will consider cardiology consult should troponins continue to trend up  Telemetry    GERD (gastroesophageal reflux disease)  PPI    DENNIS (generalized anxiety disorder)  Xanax p.r.n.    VTE Risk Mitigation (From admission, onward)           Ordered     heparin 25,000 units in dextrose 5% (100 units/ml) IV bolus from bag HIGH INTENSITY  nomogram - [facility]  As needed (PRN)        Question:  Heparin Infusion Adjustment (DO NOT MODIFY ANSWER)  Answer:  [email or web address]\epic\Images\Pharmacy\HeparinInfusions\heparin HIGH INTENSITY nomogram for [facility] [id number]    05/21/25 2014     heparin 25,000 units in dextrose 5% (100 units/ml) IV bolus from bag HIGH INTENSITY nomogram - [facility]  As needed (PRN)        Question:  Heparin Infusion Adjustment (DO NOT MODIFY ANSWER)  Answer:  [email or web address]\epic\Images\Pharmacy\HeparinInfusions\heparin HIGH INTENSITY nomogram for [facility] [id number]    05/21/25 2014     heparin 25,000 units in dextrose 5% 250 mL (100 units/mL) infusion HIGH INTENSITY nomogram - [facility]  Continuous        Question:  Begin at (units/kg/hr)  Answer:  18    05/21/25 2014                                    Thomas Rivera MD  Department of Hospital Medicine  [facility] Tele[facility] ([facility])

## 2025-05-22 NOTE — PROGRESS NOTES
Pre Op Diagnosis: Treated DVT     Post Op Diagnosis: same     Procedure:  Flowstasis device removal     Procedure performed by: Lauro Mejia MD, PA-C     Specimen Removed:  yes     Estimated Blood Loss:  minimal       The patient tolerated the procedure well and there were no complications.      Disposition:  F/U in clinic or with ordering physician    Discharge instructions:  Light activity for 24 hours.  Remove band aid in 24 hours.  No baths (showers are appropriate).      Flowstasis device removed. Sterile technique was performed in the right inguinal. Flowstasis suture cut and removed. Gauze with pressure was held for 5 minutes to reduce risk of clot and bleeding. Tegaderm with gauze was applied. Pt tolerated the procedure well without immediate complications.  Please see radiologist report for details. F/u with PCP and/or ordering physician.

## 2025-05-22 NOTE — ANESTHESIA POSTPROCEDURE EVALUATION
Anesthesia Post Evaluation    Patient: Chandni Wu    Procedure(s) Performed: Procedure(s) (LRB):  Pulmonary Embolism Thrombectomy (N/A)    Final Anesthesia Type: MAC      Patient location during evaluation: Cath Lab  Patient participation: Yes- Able to Participate  Level of consciousness: awake and alert  Post-procedure vital signs: reviewed and stable  Pain management: adequate  Airway patency: patent    PONV status at discharge: No PONV  Anesthetic complications: no      Cardiovascular status: stable  Respiratory status: unassisted and spontaneous ventilation  Hydration status: euvolemic  Follow-up not needed.              Vitals Value Taken Time   /93 05/21/25 22:16   Temp 36.8 °C (98.3 °F) 05/21/25 17:20   Pulse 114 05/21/25 22:29   Resp 18 05/21/25 22:08   SpO2 98 % 05/21/25 22:28   Vitals shown include unfiled device data.      No case tracking events are documented in the log.      Pain/Jessica Score: Pain Rating Prior to Med Admin: 7 (5/21/2025 10:08 PM)

## 2025-05-22 NOTE — PLAN OF CARE
D/C P.T. SERVICES, PT MIMA FOR ALL MOBILITY WITH DME, NO FURTHER P.T. WARRANTED AT THIS TIME UNTIL NWBING STATUS LIFTED, DME NEEDED: ROLLING WALKER

## 2025-05-22 NOTE — PROCEDURES
Interventional Radiology Post-Procedure Note     Pre Op Diagnosis: Bilateral PE     Post Op Diagnosis: Same     Procedure: Pulmonary angiogram with thrombectomy     Procedure performed by: Jackie Dodd MD     Written Informed Consent Obtained: Yes     Specimen Removed: No     Estimated Blood Loss: less than 50      Findings:   Using realtime U/S and fluoroscopic guidance, a sheath was placed into the right common femoral vein and wires and catheters were used to access the pulmonary arteries.  Aspiration thrombectomy performed yielding substantial thrombus from the left pulmonary arteries and scant material from the right without significant clot removed from the right pulmonary arteries.  Follow-up angiogram demonstrates small volume clot remaining in the left PAs.  PA pressure showed some improvement post thrombectomy.  The patient tolerated the procedure well.  Patient reported symptomatic improvement post procedure with resolution in right shoulder and neck pain and improved shortness of breath.       Plan:  -Patient to return to floor for continued care per primary team.  -Continue anticoagulation per primary team.  -Maintain bed rest x 2 hours post-procedure.    -Monitor access site for bleeding.  CFV suture/flowstasis device in place, to be removed by IR prior to discharge.       Jackie Dodd MD  Interventional Radiology

## 2025-05-22 NOTE — ANESTHESIA PREPROCEDURE EVALUATION
05/22/2025  Chandni Wu is a 45 y.o., female.  Past Medical History:   Diagnosis Date    Crohn's colitis      Past Surgical History:   Procedure Laterality Date    INTRALUMINAL GASTROINTESTINAL TRACT IMAGING VIA CAPSULE N/A 7/28/2021    Procedure: IMAGING PROCEDURE, GI TRACT, INTRALUMINAL, VIA CAPSULE;  Surgeon: Misbah Menendez RN;  Location: [facility];  Service: Endoscopy;  Laterality: N/A;         Pre-op Assessment    I have reviewed the Patient Summary Reports.     I have reviewed the Nursing Notes. I have reviewed the NPO Status.   I have reviewed the Medications.     Review of Systems  Anesthesia Hx:  No problems with previous Anesthesia             Denies Family Hx of Anesthesia complications.    Denies Personal Hx of Anesthesia complications.                    Social:  Non-Smoker, Recreational Drugs Marijuana.      Hematology/Oncology:  Hematology Normal                                     Cardiovascular:  Cardiovascular Normal                                              Pulmonary:  Pulmonary Normal        Acute bilat pulm embolism affecting all lobes.               Renal/:  Renal/ Normal                 Hepatic/GI:  Hepatic/GI Normal       Crohn's colitis.             Neurological:  Neurology Normal                                      Endocrine:   Hypothyroidism        Obesity / BMI > 30  Psych:  Psychiatric Normal                    Physical Exam  General: Alert and Oriented    Airway:  Mallampati: IV / III  Mouth Opening: Small, but > 3cm  TM Distance: > 6 cm  Tongue: Large  Neck ROM: Normal ROM    Dental:  Intact    Chest/Lungs:  Clear to auscultation, Normal Respiratory Rate    Heart:  Rate: Normal  Rhythm: Regular Rhythm        Anesthesia Plan  Type of Anesthesia, risks & benefits discussed:    Anesthesia Type: MAC  Intra-op Monitoring Plan: Standard ASA Monitors  Post Op Pain Control  Plan: multimodal analgesia and IV/PO Opioids PRN  Induction:  IV  Informed Consent: Informed consent signed with the Patient and all parties understand the risks and agree with anesthesia plan.  All questions answered.   ASA Score: 3 Emergent  Day of Surgery Review of History & Physical: H&P Update referred to the surgeon/provider.    Ready For Surgery From Anesthesia Perspective.     .

## 2025-05-22 NOTE — CARE UPDATE
45-year-old female who recently suffered a left closed nondisplaced fracture of lateral malleolus of left fibula, evaluated by ortho on 05/19, presented to [facility] for worsening shortness of breath.  Found to have bilateral PEs with right heart strain, on heparin ggt,  emergently taken for thrombectomy by IR.  She is 67 clinical without complaints during my evaluation.  Denies any chest pain, lightheadedness dizziness, worsening dyspnea on exertion.    On exam her left lower extremity pulses intact, currently in immobilizing boot.  Site thrombectomy without any bleeding.  She is not any supplemental oxygen.  Had a troponin of 1.275 with 3rd troponin repeat 0.512.  Hemodynamically stable not requiring any oxygen.    Bilateral PE with right heart strain  Left malleolus and fibula fracture  NSTEMI type 2    Plan:  -stop heparin ggt, transitioned to Eliquis  -cardiology consult for NSTEMI.  Outpatient stress test.  Troponin trending down  -continue to monitor for any bleeding events or hypoxia overnight.  -per ortho, weight bearing as tolerated for 8 weeks, physical therapy to start after healing.  Remain in immobilization boot. Elevate affected extremity. Ice affected extremity for 15-20 minutes 3-4 times daily. Limit walking to pain free, uses crutches/scooter as necessary if not pain free, less then 2000 steps/day.  Tylenol for pain or tramadol.  Avoid NSAIDs to avoid bleeding. Vitamin D3 5000IU/day, calcium 1000 mg/day

## 2025-05-22 NOTE — PT/OT/SLP EVAL
"Physical Therapy Evaluation and Discharge Note    Patient Name:  Chandni Wu   MRN:  [id number]    Recommendations:     Discharge Recommendations: No Therapy Indicated  Discharge Equipment Recommendations: walker, rolling   Barriers to discharge: None    Assessment:     Chandni Wu is a 45 y.o. female admitted with a medical diagnosis of Acute pulmonary embolism with acute cor pulmonale. .  At this time, patient is functioning at their prior level of function and does not require further acute PT services.     Recent Surgery: Procedure(s) (LRB):  Pulmonary Embolism Thrombectomy (N/A) 1 Day Post-Op    Plan:     During this hospitalization, patient does not require further acute PT services.  Please re-consult if situation changes.      Subjective     Chief Complaint: DISCOMFORT AT R GROIN FROM PROCEDURE  Patient/Family Comments/goals: EAGER TO PARTICIPATE  Pain/Comfort:  Pain Rating 1: 8/10  Location - Side 1: Right  Location 1: groin  Pain Addressed 1:  (ACTIVITY PACING)    Patients cultural, spiritual, Sabianism conflicts given the current situation:      Living Environment:  PT LIVES WITH SPOUSE AND 3 DAUGHTERS (22YO, 21YO, 19YO), LIVES IN 1 [address] HOUSE 3 STEPS TO ENTER WITH RAIL  Prior to admission, patients level of function was AMBULATING INDEP COMMUNITY DISTANCES PRIOR TO INJURY 5-19-25.  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    DME owned (not currently used): none.    Upon discharge, patient will have assistance from GOOD SUPPORT FROM SPOUSE AND 3 DAUGHTERS.    Objective:     [facility].  Patient found SEATED ON BEDSIDE COMMODE with telemetry, peripheral IV upon PT entry to room.    General Precautions: Standard, fall    Orthopedic Precautions:LLE non weight bearing (DISTAL TIBIA " "FX 5-19-25) -REVIEW BUT PT AWARE, PT REPORTS SHE GOES FOR FOLLOW UP IN 4 WEEKS TO SEE IF ANKLE IS STABLE  Braces:  (CAM WALKER)  Respiratory Status: Room air    Exams:  Cognitive Exam:  Patient is oriented to Person, Place, Time, and Situation  Postural Exam:  Patient presented with the following abnormalities:    -       Rounded shoulders  Sensation:    -       Intact  RLE ROM: WNL  RLE Strength: WNL  LLE ROM: WFL AT HIP AND KNEE, NT AT ANKLE  LLE Strength: WFL AT HIP AND KNEE    Functional Mobility:  Bed Mobility:     Rolling Left:  modified independence  Rolling Right: modified independence  Scooting: modified independence  Supine to Sit: modified independence-SPOUSE STAYS CLOSE TO ASSIST IF NEEDED  Sit to Supine: modified independence  Transfers:     Sit to Stand:  modified independence with rolling walker  Bed to Chair: modified independence with  rolling walker  using  Step Transfer  Toilet Transfer: modified independence with  rolling walker  using  Step Transfer  Gait: PT AMB 15' IN ROOM WITH RW AND SPV, NO LOB OR SOB ON ROOM AIR, SPOUSE CLOSE FOR SAFETY  Balance: GOOD SITTING BALANCE, FAIR+ DYNAMIC BALANCE DURING TF'S AND GAIT    AM-PAC 6 CLICK MOBILITY  Total Score:21     Treatment and Education:  PT AND SPOUSE EDUCATION:  - ROLE OF P.T. IN ACUTE CARE HOSPITAL SETTING  - RW USE AND SAFETY DURING TF'S AND GAIT  - ENCOURAGED TO INCREASE TIME OOB IN CHAIR TO TOLERANCE   - EDUCATED IN AND ENCOURAGED TO CONTINUE THERAPUETIC EXERCISES THROUGHOUT THE DAY TO INCREASE ACTIVITY TOLERANCE AND DECREASE RISK FOR PNEUMONIA AND BLOOD CLOTS: HIP FLEX/EXT, HIP ABD/ADD, QUAD SET, HEEL SLIDE, AP  - RISK FOR FALLS DUE TO GENERALIZED WEAKNESS, EDUCATED ON "CALL DON'T FALL", ENCOURAGED TO CALL FOR ASSISTANCE WITH ALL NEEDS     AM-PAC 6 CLICK MOBILITY  Total Score:21     Patient left up in chair with all lines intact, call button in reach, and SPOUSE present.    GOALS:   Multidisciplinary Problems       Physical Therapy Goals  "      Not on file                    DME Justifications:   Chandni's mobility limitation cannot be sufficiently resolved by the use of a cane. Her functional mobility deficit can be sufficiently resolved with the use of a Rolling Walker. Patient's mobility limitation significantly impairs their ability to participate in one of more activities of daily living.  The use of a RW will significantly improve the patient's ability to participate in MRADLS and the patient will use it on regular basis in the home.    History:     Past Medical History:   Diagnosis Date    Crohn's colitis        Past Surgical History:   Procedure Laterality Date    INTRALUMINAL GASTROINTESTINAL TRACT IMAGING VIA CAPSULE N/A 7/28/2021    Procedure: IMAGING PROCEDURE, GI TRACT, INTRALUMINAL, VIA CAPSULE;  Surgeon: Misbah Menendez RN;  Location: [facility];  Service: Endoscopy;  Laterality: N/A;       Time Tracking:     PT Received On: 05/22/25  PT Start Time: 1140     PT Stop Time: 1205  PT Total Time (min): 25 min     Billable Minutes: Evaluation 25 05/22/2025

## 2025-05-22 NOTE — PLAN OF CARE
Problem: Adult Inpatient Plan of Care  Goal: Plan of Care Review  Outcome: Progressing  Flowsheets (Taken 5/22/2025 1628)  Plan of Care Reviewed With:   patient   spouse   family  Goal: Absence of Hospital-Acquired Illness or Injury  Outcome: Progressing  Intervention: Identify and Manage Fall Risk  Flowsheets (Taken 5/22/2025 1628)  Safety Promotion/Fall Prevention:   assistive device/personal item within reach   bedside commode chair   Fall Risk reviewed with patient/family   family expresses understanding of fall risk and prevention   family to remain at bedside   lighting adjusted   nonskid shoes/socks when out of bed   medications reviewed   patient expresses understanding of fall risk and prevention   room near unit station   side rails raised x 2   Supervised toileting - stay within arms reach  Intervention: Prevent Skin Injury  Flowsheets (Taken 5/22/2025 1628)  Body Position: weight shifting  Skin Protection:   transparent dressing maintained   incontinence pads utilized  Device Skin Pressure Protection: absorbent pad utilized/changed  Intervention: Prevent and Manage VTE (Venous Thromboembolism) Risk  Flowsheets (Taken 5/22/2025 1628)  VTE Prevention/Management:   bleeding precautions maintained   bleeding risk assessed  Intervention: Prevent Infection  Flowsheets (Taken 5/22/2025 1628)  Infection Prevention:   hand hygiene promoted   rest/sleep promoted  Goal: Optimal Comfort and Wellbeing  Outcome: Progressing  Intervention: Monitor Pain and Promote Comfort  Flowsheets (Taken 5/22/2025 1628)  Pain Management Interventions: pain management plan reviewed with patient/caregiver     Problem: Adult Inpatient Plan of Care  Goal: Absence of Hospital-Acquired Illness or Injury  Outcome: Progressing  Intervention: Identify and Manage Fall Risk  Flowsheets (Taken 5/22/2025 1628)  Safety Promotion/Fall Prevention:   assistive device/personal item within reach   bedside commode chair   Fall Risk reviewed with  patient/family   family expresses understanding of fall risk and prevention   family to remain at bedside   lighting adjusted   nonskid shoes/socks when out of bed   medications reviewed   patient expresses understanding of fall risk and prevention   room near unit station   side rails raised x 2   Supervised toileting - stay within arms reach  Intervention: Prevent Skin Injury  Flowsheets (Taken 5/22/2025 1628)  Body Position: weight shifting  Skin Protection:   transparent dressing maintained   incontinence pads utilized  Device Skin Pressure Protection: absorbent pad utilized/changed  Intervention: Prevent and Manage VTE (Venous Thromboembolism) Risk  Flowsheets (Taken 5/22/2025 1628)  VTE Prevention/Management:   bleeding precautions maintained   bleeding risk assessed  Intervention: Prevent Infection  Flowsheets (Taken 5/22/2025 1628)  Infection Prevention:   hand hygiene promoted   rest/sleep promoted     Problem: Adult Inpatient Plan of Care  Goal: Optimal Comfort and Wellbeing  Outcome: Progressing  Intervention: Monitor Pain and Promote Comfort  Flowsheets (Taken 5/22/2025 1628)  Pain Management Interventions: pain management plan reviewed with patient/caregiver     Problem: Wound  Goal: Optimal Coping  Outcome: Progressing  Goal: Optimal Functional Ability  Intervention: Optimize Functional Ability  Flowsheets (Taken 5/22/2025 1628)  Activity Management: Up to bedside commode - L3  Activity Assistance Provided: assistance, 1 person      4 = No assist / stand by assistance

## 2025-05-22 NOTE — SUBJECTIVE & OBJECTIVE
Past Medical History:   Diagnosis Date    Crohn's colitis        Past Surgical History:   Procedure Laterality Date    INTRALUMINAL GASTROINTESTINAL TRACT IMAGING VIA CAPSULE N/A 7/28/2021    Procedure: IMAGING PROCEDURE, GI TRACT, INTRALUMINAL, VIA CAPSULE;  Surgeon: Misbah Menendez RN;  Location: [facility];  Service: Endoscopy;  Laterality: N/A;       Review of patient's allergies indicates:   Allergen Reactions    Wool     Adhesive Rash    Clarithromycin Nausea And Vomiting and Other (See Comments)     GI INTOL    Clindamycin Nausea And Vomiting and Other (See Comments)       No current facility-administered medications on file prior to encounter.     Current Outpatient Medications on File Prior to Encounter   Medication Sig    levothyroxine (SYNTHROID) 50 MCG tablet Take 1 tablet (50 mcg total) by mouth once daily.    naproxen (NAPROSYN) 500 MG tablet Take 1 tablet (500 mg total) by mouth 2 (two) times daily with meals.    tamsulosin (FLOMAX) 0.4 mg Cap Take 1 capsule (0.4 mg total) by mouth once daily.     Family History       Problem Relation (Age of Onset)    Breast cancer Maternal Aunt    Hypertension Father, Maternal Grandmother, Maternal Grandfather          Tobacco Use    Smoking status: Never    Smokeless tobacco: Never   Substance and Sexual Activity    Alcohol use: Not Currently    Drug use: Yes     Types: Marijuana    Sexual activity: Yes     Partners: Male     Birth control/protection: Partner-Vasectomy     Review of Systems   Constitutional:  Negative for fatigue and fever.   HENT:  Negative for sinus pressure.    Eyes:  Negative for visual disturbance.   Respiratory:  Positive for shortness of breath.    Cardiovascular:  Positive for palpitations. Negative for chest pain.   Gastrointestinal:  Negative for nausea and vomiting.   Genitourinary:  Negative for difficulty urinating.   Musculoskeletal:  Negative for back pain.   Skin:  Negative for rash.   Neurological:  Negative for headaches.    Psychiatric/Behavioral:  Negative for confusion.      Objective:     Vital Signs (Most Recent):  Temp: 98.3 °F (36.8 °C) (05/21/25 1720)  Pulse: (!) 115 (05/21/25 2216)  Resp: 18 (05/21/25 2208)  BP: (!) 139/93 (05/21/25 2216)  SpO2: 98 % (05/21/25 2216) Vital Signs (24h Range):  Temp:  [98.3 °F (36.8 °C)] 98.3 °F (36.8 °C)  Pulse:  [115-128] 115  Resp:  [18-19] 18  SpO2:  [96 %-98 %] 98 %  BP: (127-144)/(79-93) 139/93     Weight: 95.3 kg (210 lb)  Body mass index is 38.41 kg/m².     Physical Exam  Constitutional:       General: She is not in acute distress.     Appearance: She is well-developed. She is not diaphoretic.   HENT:      Head: Normocephalic and atraumatic.   Eyes:      Pupils: Pupils are equal, round, and reactive to light.   Cardiovascular:      Rate and Rhythm: Regular rhythm. Tachycardia present.      Heart sounds: Normal heart sounds. No murmur heard.     No friction rub. No gallop.   Pulmonary:      Effort: Pulmonary effort is normal. No respiratory distress.      Breath sounds: Normal breath sounds. No stridor. No wheezing or rales.   Abdominal:      General: Bowel sounds are normal. There is no distension.      Palpations: Abdomen is soft. There is no mass.      Tenderness: There is no abdominal tenderness. There is no guarding.   Skin:     General: Skin is warm.      Findings: No erythema.   Neurological:      Mental Status: She is alert and oriented to person, place, and time.              CRANIAL NERVES     CN III, IV, VI   Pupils are equal, round, and reactive to light.       Significant Labs:   Results for orders placed or performed during the hospital encounter of 05/21/25   Hepatitis C Antibody    Collection Time: 05/21/25  6:33 PM   Result Value Ref Range    Hep C Ab Interp Negative Negative   HCV Virus Hold Specimen    Collection Time: 05/21/25  6:33 PM   Result Value Ref Range    Extra Tube Hold for add-ons.    HIV 1/2 Ag/Ab (4th Gen)    Collection Time: 05/21/25  6:33 PM   Result  Value Ref Range    HIV 1/2 Ag/Ab Negative Negative   D dimer, quantitative    Collection Time: 05/21/25  6:33 PM   Result Value Ref Range    D-Dimer 2.26 (H) <0.50 mg/L FEU   TSH    Collection Time: 05/21/25  6:33 PM   Result Value Ref Range    TSH 1.992 0.400 - 4.000 uIU/mL   Troponin I    Collection Time: 05/21/25  6:33 PM   Result Value Ref Range    Troponin-I 1.275 (H) <=0.026 ng/mL   Brain natriuretic peptide    Collection Time: 05/21/25  6:33 PM   Result Value Ref Range    BNP 45 0 - 99 pg/mL   Comprehensive metabolic panel    Collection Time: 05/21/25  6:33 PM   Result Value Ref Range    Sodium 141 136 - 145 mmol/L    Potassium 3.5 3.5 - 5.1 mmol/L    Chloride 106 95 - 110 mmol/L    CO2 21 (L) 23 - 29 mmol/L    Glucose 114 (H) 70 - 110 mg/dL    BUN 14 6 - 20 mg/dL    Creatinine 1.0 0.5 - 1.4 mg/dL    Calcium 9.5 8.7 - 10.5 mg/dL    Protein Total 7.8 6.0 - 8.4 gm/dL    Albumin 3.9 3.5 - 5.2 g/dL    Bilirubin Total 0.3 0.1 - 1.0 mg/dL     40 - 150 unit/L    AST 23 11 - 45 unit/L    ALT 21 10 - 44 unit/L    Anion Gap 14 8 - 16 mmol/L    eGFR >60 >60 mL/min/1.73/m2   CBC with Differential    Collection Time: 05/21/25  6:33 PM   Result Value Ref Range    WBC 13.31 (H) 3.90 - 12.70 K/uL    RBC 4.74 4.00 - 5.40 M/uL    HGB 13.5 12.0 - 16.0 gm/dL    HCT 40.7 37.0 - 48.5 %    MCV 86 82 - 98 fL    MCH 28.5 27.0 - 31.0 pg    MCHC 33.2 32.0 - 36.0 g/dL    RDW 13.0 11.5 - 14.5 %    Platelet Count 296 150 - 450 K/uL    MPV 10.6 9.2 - 12.9 fL    Nucleated RBC 0 <=0 /100 WBC    Neut % 77.4 (H) 38 - 73 %    Lymph % 15.3 (L) 18 - 48 %    Mono % 6.1 4 - 15 %    Eos % 0.7 <=8 %    Basophil % 0.2 <=1.9 %    Imm Grans % 0.3 0.0 - 0.5 %    Neut # 10.30 (H) 1.8 - 7.7 K/uL    Lymph # 2.04 1 - 4.8 K/uL    Mono # 0.81 0.3 - 1 K/uL    Eos # 0.09 <=0.5 K/uL    Baso # 0.03 <=0.2 K/uL    Imm Grans # 0.04 0.00 - 0.04 K/uL   APTT    Collection Time: 05/21/25  6:33 PM   Result Value Ref Range    PTT 25.7 21.0 - 32.0 seconds    Protime-INR    Collection Time: 05/21/25  6:33 PM   Result Value Ref Range    PT 10.7 9.0 - 12.5 seconds    INR 0.9 0.8 - 1.2   POCT COVID-19 Rapid Screening    Collection Time: 05/21/25  8:23 PM   Result Value Ref Range    POC Rapid COVID Negative Negative     Acceptable Yes    POCT Influenza A/B Molecular    Collection Time: 05/21/25  8:23 PM   Result Value Ref Range    POC Molecular Influenza A Ag Negative Negative    POC Molecular Influenza B Ag Negative Negative     Acceptable Yes         Significant Imaging:   Imaging Results               CTA Chest Non-Coronary (PE Studies) (Final result)  Result time 05/21/25 19:59:41      Final result by Charles LopezFranki), MD (05/21/25 19:59:41)                   Impression:      Bilateral pulmonary embolism involving the main pulmonary arteries extending into segmental and lobar branches of all lobes of the lung.  Right ventricle strain and hypertrophy is present.  No evidence of pulmonary infarction.    This report was flagged in Epic as abnormal.    All CT scans at this facility use dose modulation, iterative reconstruction and/or weight based dosing when appropriate to reduce radiation dose to as low as reasonably achievable.      Electronically signed by: Charles Lopez MD  Date:    05/21/2025  Time:    19:59               Narrative:    EXAMINATION:  CTA CHEST NON CORONARY (PE STUDIES)    CLINICAL HISTORY:  Pulmonary embolism (PE) suspected, high prob;Pulmonary embolism (PE) suspected, positive D-dimer;    TECHNIQUE:  Standard CTA of the chest performed with 100 cc Omnipaque 350 utilizing 3-D MIP reformations.    COMPARISON:  None    FINDINGS:  Heart is normal in size.  However there is evidence of right ventricle enlargement with bulging of the intraventricular septum into the left ventricle.  Findings are compatible with right ventricle strain and hypertrophy.    No coronary artery calcifications.  No pericardial  effusions    There is pulmonary embolism involving the main left pulmonary artery extending into the lobar and segmental branches of the left upper lobe and lower lobe.  There is also pulmonary embolism involving the right main pulmonary artery extending into lobar and segmental branches of the right upper middle and lower lobes.    The lungs appear clear.  No evidence of infarction.  No evidence of pleural effusion or pneumothorax.    No evidence of mediastinal hilar adenopathy.                                       X-Ray Chest AP Portable (Final result)  Result time 05/21/25 18:35:08      Final result by Charles LopezFranki), MD (05/21/25 18:35:08)                   Impression:     Normal chest.    Finalized on: 5/21/2025 6:35 PM By:  Crescencio Lopez MD  [facility]# [id number]      2025-05-21 18:37:[id number]     [facility]               Narrative:    EXAM: XR CHEST AP PORTABLE    CLINICAL HISTORY: Shortness of breath    FINDINGS:  Heart is normal in size.  Lungs are clear.    One view.

## 2025-05-22 NOTE — ASSESSMENT & PLAN NOTE
Troponin trending down 1.275-->0.861, likely demand ischemia due to PE   Echo pending   Recommend outpatient stress test

## 2025-05-22 NOTE — ASSESSMENT & PLAN NOTE
Likely secondary to RV strain from PE   EKG negative for ST changes or LBBB   Obtain echo   Trend troponins  Will consider cardiology consult should troponins continue to trend up  Telemetry

## 2025-05-22 NOTE — SUBJECTIVE & OBJECTIVE
Past Medical History:   Diagnosis Date    Crohn's colitis        Past Surgical History:   Procedure Laterality Date    INTRALUMINAL GASTROINTESTINAL TRACT IMAGING VIA CAPSULE N/A 7/28/2021    Procedure: IMAGING PROCEDURE, GI TRACT, INTRALUMINAL, VIA CAPSULE;  Surgeon: Misbah Menendez RN;  Location: [facility];  Service: Endoscopy;  Laterality: N/A;       Review of patient's allergies indicates:   Allergen Reactions    Wool     Adhesive Rash    Clarithromycin Nausea And Vomiting and Other (See Comments)     GI INTOL    Clindamycin Nausea And Vomiting and Other (See Comments)       No current facility-administered medications on file prior to encounter.     Current Outpatient Medications on File Prior to Encounter   Medication Sig    levothyroxine (SYNTHROID) 50 MCG tablet Take 1 tablet (50 mcg total) by mouth once daily.    naproxen (NAPROSYN) 500 MG tablet Take 1 tablet (500 mg total) by mouth 2 (two) times daily with meals.    tamsulosin (FLOMAX) 0.4 mg Cap Take 1 capsule (0.4 mg total) by mouth once daily.     Family History       Problem Relation (Age of Onset)    Breast cancer Maternal Aunt    Hypertension Father, Maternal Grandmother, Maternal Grandfather          Tobacco Use    Smoking status: Never    Smokeless tobacco: Never   Substance and Sexual Activity    Alcohol use: Not Currently    Drug use: Yes     Types: Marijuana    Sexual activity: Yes     Partners: Male     Birth control/protection: Partner-Vasectomy     Review of Systems   Constitutional: Negative.   HENT: Negative.     Eyes: Negative.    Cardiovascular: Negative.  Negative for chest pain, dyspnea on exertion, irregular heartbeat, leg swelling and palpitations.   Respiratory: Negative.     Skin: Negative.    Musculoskeletal:  Positive for joint pain (right shoulder pain).   Gastrointestinal: Negative.    Genitourinary: Negative.    Neurological: Negative.    Psychiatric/Behavioral: Negative.       Objective:     Vital Signs (Most Recent):  Temp:  97.9 °F (36.6 °C) (05/22/25 0748)  Pulse: 101 (05/22/25 0844)  Resp: 17 (05/22/25 0748)  BP: 123/78 (05/22/25 0844)  SpO2: (!) 93 % (05/22/25 0748) Vital Signs (24h Range):  Temp:  [97.9 °F (36.6 °C)-98.3 °F (36.8 °C)] 97.9 °F (36.6 °C)  Pulse:  [] 101  Resp:  [16-20] 17  SpO2:  [93 %-100 %] 93 %  BP: (123-144)/(76-93) 123/78     Weight: 95.3 kg (210 lb)  Body mass index is 34.95 kg/m².    SpO2: (!) 93 %         Intake/Output Summary (Last 24 hours) at 5/22/2025 1025  Last data filed at 5/22/2025 0404  Gross per 24 hour   Intake 500 ml   Output --   Net 500 ml       Lines/Drains/Airways       Peripheral Intravenous Line  Duration                  Peripheral IV - Single Lumen 05/21/25 1815 20 G 1 in Anterior;Left;Proximal Forearm <1 day                     Physical Exam  Vitals and nursing note reviewed.   Constitutional:       Appearance: Normal appearance.   HENT:      Head: Normocephalic.   Eyes:      Pupils: Pupils are equal, round, and reactive to light.   Cardiovascular:      Rate and Rhythm: Normal rate and regular rhythm.      Heart sounds: Normal heart sounds, S1 normal and S2 normal. No murmur heard.     No S3 or S4 sounds.   Pulmonary:      Effort: Pulmonary effort is normal.      Breath sounds: Normal breath sounds.   Abdominal:      General: Bowel sounds are normal.      Palpations: Abdomen is soft.   Musculoskeletal:      Cervical back: Normal range of motion.      Right lower leg: No edema.      Left lower leg: No edema.      Comments: Wearing walking boot on left foot   Skin:     Capillary Refill: Capillary refill takes less than 2 seconds.   Neurological:      General: No focal deficit present.      Mental Status: She is alert and oriented to person, place, and time.   Psychiatric:         Mood and Affect: Mood normal.         Behavior: Behavior normal.         Thought Content: Thought content normal.          Significant Labs: CMP   Recent Labs   Lab 05/21/25  1833 05/22/25  0540    140  "  K 3.5 4.1    111*   CO2 21* 21*   * 116*   BUN 14 11   CREATININE 1.0 0.7   CALCIUM 9.5 8.5*   PROT 7.8  --    ALBUMIN 3.9  --    BILITOT 0.3  --    ALKPHOS 104  --    AST 23  --    ALT 21  --    ANIONGAP 14 8   , CBC   Recent Labs   Lab 05/21/25  1833 05/22/25  0540   WBC 13.31* 12.68   HGB 13.5 11.8*   HCT 40.7 37.2    266   , Troponin No results for input(s): "TROPONINIHS" in the last 48 hours., and All pertinent lab results from the last 24 hours have been reviewed.    Significant Imaging: CTA chest, echo pending  "

## 2025-05-22 NOTE — PLAN OF CARE
[facility] - Telemetry (Hospital)  Initial Discharge Assessment       Primary Care Provider: Misael Bowling MD    Admission Diagnosis: SOB (shortness of breath) [R06.02]  Tachycardia [R00.0]  Elevated troponin [R79.89]  Pulmonary embolism, bilateral [I26.99]  Acute pulmonary embolism, unspecified pulmonary embolism type, unspecified whether acute cor pulmonale present [I26.99]    Admission Date: 5/21/2025  Expected Discharge Date: 5/23/2025    Transition of Care Barriers: None    Payor: BLUE CROSS BLUE SHIELD / Plan: [place] PPO / Product Type: PPO /     Extended Emergency Contact Information  Primary Emergency Contact: Jefferson Wu   [facility]  Mobile Phone: [phone]  Relation: Spouse    Discharge Plan A: Home         [facility] [place] [address]  [address]  [address]  [place] [address]  Phone: [phone] Fax: [phone]      Initial Assessment (most recent)       Adult Discharge Assessment - 05/22/25 1602          Discharge Assessment    Assessment Type Discharge Planning Assessment     Confirmed/corrected address, phone number and insurance Yes     Confirmed Demographics Correct on Facesheet     Source of Information patient;family     People in Home spouse     Facility Arrived From: Home     Do you expect to return to your current living situation? Yes     Do you have help at home or someone to help you manage your care at home? Yes     Who are your caregiver(s) and their phone number(s)? Spouse, several other relatives     Prior to hospitilization cognitive status: Alert/Oriented     Current cognitive status: Alert/Oriented     Walking or Climbing Stairs Difficulty no     Dressing/Bathing Difficulty no     Equipment Currently Used at Home crutches;shower chair     Readmission within 30 days? No     Patient currently being followed by outpatient case management? No     Do you currently have service(s) that help you manage your care at home?  No     Do you take prescription medications? Yes     Do you have prescription coverage? Yes     Do you have any problems affording any of your prescribed medications? No     Is the patient taking medications as prescribed? yes     Who is going to help you get home at discharge? Family will assist     How do you get to doctors appointments? car, drives self;family or friend will provide     Are you on dialysis? No     Do you take coumadin? No     Discharge Plan A Home     Discharge Plan discussed with: Patient     Transition of Care Barriers None                   No needs expressed.

## 2025-05-22 NOTE — HPI
Patient is a 45-year-old  female with a PMH of Crohn's disease, GERD, hypothyroidism, DENNIS, cholelithiasis who presents to [facility] ED with shortness a breath.  Of note patient recently sustained distal fibula fracture approximately 2 days ago.  She followed up with Orthopedic surgery and was placed in walking boot with weight-bearing as tolerated.  Shortness a breath has progressively worsened.  Associated symptoms include palpitations.  Denies any lower extremity swelling. Patient states she has been ambulating with a knee scooter and weight bears as tolerated. Denies any oral contraceptive use or prior hx of blood clots.     In the ED, heart rate 128 O2 saturation 96% on room air.  WBC 13.3k, D-dimer 2.26, troponin 1.275.  CTA chest showed bilateral pulmonary embolism involving the main pulmonary arteries extending into segmental and lobar branches of all lobes of the lung. Right ventricle strain and hypertrophy is present.  Case was discussed with interventional radiology who recommended initiating heparin drip.  Patient was taken immediately for thrombectomy upon arrival to [facility].

## 2025-05-22 NOTE — ASSESSMENT & PLAN NOTE
Bilateral PE with RV strain on CTA   PE severity index class II  Elevated troponins  Indicative of intermediate-high early mortality risk   IR consult on case   Empiric heparin   Thrombectomy   Appears to be unprovoked  No history of prior VTE   Obtain echo  Possible need for outpatient heme eval for hypercoagulable workup

## 2025-05-22 NOTE — SEDATION DOCUMENTATION
Pt placed supine on flouro table at this time. CRNA to monitor all VS and give meds throughout the case, refer to anesthesia flowsheet.

## 2025-05-23 ENCOUNTER — PATIENT MESSAGE (OUTPATIENT)
Dept: HEMATOLOGY/ONCOLOGY | Facility: CLINIC | Age: 45
End: 2025-05-23
Payer: COMMERCIAL

## 2025-05-23 ENCOUNTER — TELEPHONE (OUTPATIENT)
Dept: CARDIOLOGY | Facility: CLINIC | Age: 45
End: 2025-05-23
Payer: COMMERCIAL

## 2025-05-23 VITALS
BODY MASS INDEX: 34.99 KG/M2 | HEIGHT: 65 IN | RESPIRATION RATE: 17 BRPM | HEART RATE: 99 BPM | WEIGHT: 210 LBS | SYSTOLIC BLOOD PRESSURE: 129 MMHG | OXYGEN SATURATION: 96 % | TEMPERATURE: 98 F | DIASTOLIC BLOOD PRESSURE: 82 MMHG

## 2025-05-23 LAB
ANION GAP (OHS): 9 MMOL/L (ref 8–16)
BUN SERPL-MCNC: 12 MG/DL (ref 6–20)
CALCIUM SERPL-MCNC: 8.5 MG/DL (ref 8.7–10.5)
CHLORIDE SERPL-SCNC: 108 MMOL/L (ref 95–110)
CO2 SERPL-SCNC: 23 MMOL/L (ref 23–29)
CREAT SERPL-MCNC: 0.7 MG/DL (ref 0.5–1.4)
GFR SERPLBLD CREATININE-BSD FMLA CKD-EPI: >60 ML/MIN/1.73/M2
GLUCOSE SERPL-MCNC: 115 MG/DL (ref 70–110)
OHS QRS DURATION: 108 MS
OHS QTC CALCULATION: 445 MS
POTASSIUM SERPL-SCNC: 3.7 MMOL/L (ref 3.5–5.1)
SODIUM SERPL-SCNC: 140 MMOL/L (ref 136–145)

## 2025-05-23 PROCEDURE — 80048 BASIC METABOLIC PNL TOTAL CA: CPT | Performed by: FAMILY MEDICINE

## 2025-05-23 PROCEDURE — 25000003 PHARM REV CODE 250: Performed by: FAMILY MEDICINE

## 2025-05-23 PROCEDURE — 36415 COLL VENOUS BLD VENIPUNCTURE: CPT | Performed by: FAMILY MEDICINE

## 2025-05-23 PROCEDURE — 25000003 PHARM REV CODE 250: Performed by: STUDENT IN AN ORGANIZED HEALTH CARE EDUCATION/TRAINING PROGRAM

## 2025-05-23 RX ORDER — APIXABAN 5 MG (74)
KIT ORAL
Qty: 74 EACH | Refills: 1 | Status: SHIPPED | OUTPATIENT
Start: 2025-05-23 | End: 2025-06-28

## 2025-05-23 RX ADMIN — CHOLECALCIFEROL TAB 125 MCG (5000 UNIT) 5000 UNITS: 125 TAB at 08:05

## 2025-05-23 RX ADMIN — ACETAMINOPHEN 650 MG: 325 TABLET ORAL at 12:05

## 2025-05-23 RX ADMIN — APIXABAN 10 MG: 2.5 TABLET, FILM COATED ORAL at 08:05

## 2025-05-23 RX ADMIN — ACETAMINOPHEN 650 MG: 325 TABLET ORAL at 03:05

## 2025-05-23 RX ADMIN — PANTOPRAZOLE SODIUM 40 MG: 40 TABLET, DELAYED RELEASE ORAL at 08:05

## 2025-05-23 RX ADMIN — LEVOTHYROXINE SODIUM 50 MCG: 0.05 TABLET ORAL at 06:05

## 2025-05-23 RX ADMIN — CALCIUM CARBONATE (ANTACID) CHEW TAB 500 MG 1000 MG: 500 CHEW TAB at 08:05

## 2025-05-23 NOTE — PLAN OF CARE
Problem: Adult Inpatient Plan of Care  Goal: Plan of Care Review  Outcome: Progressing  Flowsheets (Taken 5/23/2025 1228)  Plan of Care Reviewed With:   patient   spouse  Goal: Readiness for Transition of Care  Outcome: Progressing     Problem: Wound  Goal: Optimal Coping  Outcome: Progressing  Intervention: Support Patient and Family Response  Flowsheets (Taken 5/23/2025 1228)  Supportive Measures:   active listening utilized   relaxation techniques promoted   verbalization of feelings encouraged  Family/Support System Care:   presence promoted   involvement promoted   support provided   self-care encouraged  Goal: Absence of Infection Signs and Symptoms  Outcome: Progressing  Intervention: Prevent or Manage Infection  Flowsheets (Taken 5/23/2025 1228)  Fever Reduction/Comfort Measures:   lightweight bedding   lightweight clothing  Infection Management: aseptic technique maintained  Isolation Precautions: precautions maintained  Goal: Skin Health and Integrity  Outcome: Progressing  Goal: Optimal Wound Healing  Intervention: Promote Wound Healing  Flowsheets (Taken 5/23/2025 1228)  Sleep/Rest Enhancement:   regular sleep/rest pattern promoted   relaxation techniques promoted     Problem: Wound  Goal: Skin Health and Integrity  Outcome: Progressing     Problem: Pain Acute  Goal: Optimal Pain Control and Function  Outcome: Progressing  Intervention: Develop Pain Management Plan  Flowsheets (Taken 5/23/2025 1228)  Pain Management Interventions:   pain management plan reviewed with patient/caregiver   relaxation techniques promoted   quiet environment facilitated

## 2025-05-23 NOTE — PLAN OF CARE
[facility] - Telemetry (Hospital)  Discharge Final Note    Primary Care Provider: Misael Bowling MD    Expected Discharge Date: 5/23/2025    Final Discharge Note (most recent)       Final Note - 05/23/25 1051          Final Note    Assessment Type Final Discharge Note     Anticipated Discharge Disposition Home or Self Care        Post-Acute Status    Discharge Delays None known at this time                   Pt to discharge home today. Pt already has RW at home.   Pt has non-[facility] PCP     Important Message from Medicare

## 2025-05-23 NOTE — DISCHARGE INSTRUCTIONS
Patient instructed to not fly for 4 weeks.  If going on long distance car trip we will need to make frequent stops and ensure circulation.  If having shortness of breath take breaks.  If worsening shortness of breath, returned to the hospital for evaluation.      SURVEY  Our goal at [facility] is to always give you outstanding care and exceptional service. You may receive a survey from [employer] by mail, text or e-mail in the next 24-48 hours asking about the care you received with us. The survey should only take 5-10 minutes to complete and is very important to us.     Your feedback provides us with a way to recognize our staff who work tirelessly to provide the best care! Also, your responses help us learn how to improve when your experience was below our aspiration of excellence. We are always looking for ways to improve your stay. We WILL use your feedback to continue making improvements to help us provide the highest quality care. We keep your personal information and feedback confidential. We appreciate your time completing this survey and can't wait to hear from you!!!    We look forward to your continued care with us! Thanks so much for choosing [facility] for your healthcare needs!

## 2025-05-23 NOTE — PLAN OF CARE
Plan of care reviewed with patient with verbal understanding. Chart and orders reviewed.  Pt remains free from falls this shift, Safety precautions in place.   Pt currently resting comfortably in bed. Pain controlled with po pain med (see emar for pain admin).  Purposeful rounding with bed in lowest position, side rails up, call light in reach.  Will continue to monitor until end of shift.       Problem: Adult Inpatient Plan of Care  Goal: Plan of Care Review  Outcome: Progressing  Goal: Patient-Specific Goal (Individualized)  Outcome: Progressing     Problem: Wound  Goal: Optimal Coping  Outcome: Progressing     Problem: Pain Acute  Goal: Optimal Pain Control and Function  Outcome: Progressing

## 2025-05-24 ENCOUNTER — HOSPITAL ENCOUNTER (EMERGENCY)
Facility: HOSPITAL | Age: 45
Discharge: HOME | End: 2025-05-24
Attending: EMERGENCY MEDICINE
Payer: COMMERCIAL

## 2025-05-24 ENCOUNTER — NURSE TRIAGE (OUTPATIENT)
Dept: ADMINISTRATIVE | Facility: CLINIC | Age: 45
End: 2025-05-24
Payer: COMMERCIAL

## 2025-05-24 VITALS
WEIGHT: 210 LBS | OXYGEN SATURATION: 95 % | BODY MASS INDEX: 34.99 KG/M2 | TEMPERATURE: 101 F | DIASTOLIC BLOOD PRESSURE: 70 MMHG | SYSTOLIC BLOOD PRESSURE: 150 MMHG | RESPIRATION RATE: 20 BRPM | HEIGHT: 65 IN | HEART RATE: 103 BPM

## 2025-05-24 DIAGNOSIS — R07.9 CHEST PAIN: ICD-10-CM

## 2025-05-24 DIAGNOSIS — U07.1 COVID: Primary | ICD-10-CM

## 2025-05-24 DIAGNOSIS — U07.1 COVID-19 VIRUS DETECTED: ICD-10-CM

## 2025-05-24 LAB
ABSOLUTE EOSINOPHIL (OHS): 0.2 K/UL
ABSOLUTE MONOCYTE (OHS): 0.52 K/UL (ref 0.3–1)
ABSOLUTE NEUTROPHIL COUNT (OHS): 6.93 K/UL (ref 1.8–7.7)
ALBUMIN SERPL BCP-MCNC: 3.9 G/DL (ref 3.5–5.2)
ALP SERPL-CCNC: 106 UNIT/L (ref 40–150)
ALT SERPL W/O P-5'-P-CCNC: 21 UNIT/L (ref 10–44)
ANION GAP (OHS): 14 MMOL/L (ref 8–16)
AST SERPL-CCNC: 19 UNIT/L (ref 11–45)
BASOPHILS # BLD AUTO: 0.02 K/UL
BASOPHILS NFR BLD AUTO: 0.2 %
BILIRUB SERPL-MCNC: 0.3 MG/DL (ref 0.1–1)
BNP SERPL-MCNC: 80 PG/ML (ref 0–99)
BUN SERPL-MCNC: 11 MG/DL (ref 6–20)
CALCIUM SERPL-MCNC: 9.5 MG/DL (ref 8.7–10.5)
CHLORIDE SERPL-SCNC: 102 MMOL/L (ref 95–110)
CO2 SERPL-SCNC: 24 MMOL/L (ref 23–29)
CREAT SERPL-MCNC: 0.8 MG/DL (ref 0.5–1.4)
CTP QC/QA: YES
CTP QC/QA: YES
ERYTHROCYTE [DISTWIDTH] IN BLOOD BY AUTOMATED COUNT: 13.2 % (ref 11.5–14.5)
GFR SERPLBLD CREATININE-BSD FMLA CKD-EPI: >60 ML/MIN/1.73/M2
GLUCOSE SERPL-MCNC: 104 MG/DL (ref 70–110)
HCT VFR BLD AUTO: 35.7 % (ref 37–48.5)
HGB BLD-MCNC: 11.8 GM/DL (ref 12–16)
IMM GRANULOCYTES # BLD AUTO: 0.04 K/UL (ref 0–0.04)
IMM GRANULOCYTES NFR BLD AUTO: 0.4 % (ref 0–0.5)
LACTATE SERPL-SCNC: 1.7 MMOL/L (ref 0.5–2.2)
LYMPHOCYTES # BLD AUTO: 1.47 K/UL (ref 1–4.8)
MAGNESIUM SERPL-MCNC: 1.7 MG/DL (ref 1.6–2.6)
MCH RBC QN AUTO: 28.9 PG (ref 27–31)
MCHC RBC AUTO-ENTMCNC: 33.1 G/DL (ref 32–36)
MCV RBC AUTO: 87 FL (ref 82–98)
NUCLEATED RBC (/100WBC) (OHS): 0 /100 WBC
PLATELET # BLD AUTO: 223 K/UL (ref 150–450)
PMV BLD AUTO: 10.5 FL (ref 9.2–12.9)
POC MOLECULAR INFLUENZA A AGN: NEGATIVE
POC MOLECULAR INFLUENZA B AGN: NEGATIVE
POTASSIUM SERPL-SCNC: 3.6 MMOL/L (ref 3.5–5.1)
PROCALCITONIN SERPL-MCNC: 0.02 NG/ML
PROT SERPL-MCNC: 8 GM/DL (ref 6–8.4)
RBC # BLD AUTO: 4.09 M/UL (ref 4–5.4)
RELATIVE EOSINOPHIL (OHS): 2.2 %
RELATIVE LYMPHOCYTE (OHS): 16 % (ref 18–48)
RELATIVE MONOCYTE (OHS): 5.7 % (ref 4–15)
RELATIVE NEUTROPHIL (OHS): 75.5 % (ref 38–73)
SARS-COV-2 RDRP RESP QL NAA+PROBE: POSITIVE
SODIUM SERPL-SCNC: 140 MMOL/L (ref 136–145)
TROPONIN I SERPL DL<=0.01 NG/ML-MCNC: 0.09 NG/ML
WBC # BLD AUTO: 9.18 K/UL (ref 3.9–12.7)

## 2025-05-24 PROCEDURE — 99285 EMERGENCY DEPT VISIT HI MDM: CPT | Mod: 25,ER

## 2025-05-24 PROCEDURE — 83605 ASSAY OF LACTIC ACID: CPT | Mod: ER | Performed by: EMERGENCY MEDICINE

## 2025-05-24 PROCEDURE — 84145 PROCALCITONIN (PCT): CPT | Mod: ER | Performed by: EMERGENCY MEDICINE

## 2025-05-24 PROCEDURE — 87040 BLOOD CULTURE FOR BACTERIA: CPT | Performed by: EMERGENCY MEDICINE

## 2025-05-24 PROCEDURE — 84484 ASSAY OF TROPONIN QUANT: CPT | Mod: ER | Performed by: EMERGENCY MEDICINE

## 2025-05-24 PROCEDURE — 87502 INFLUENZA DNA AMP PROBE: CPT | Mod: ER

## 2025-05-24 PROCEDURE — 83735 ASSAY OF MAGNESIUM: CPT | Mod: ER | Performed by: EMERGENCY MEDICINE

## 2025-05-24 PROCEDURE — 25000003 PHARM REV CODE 250: Mod: ER | Performed by: EMERGENCY MEDICINE

## 2025-05-24 PROCEDURE — 93010 ELECTROCARDIOGRAM REPORT: CPT | Mod: ,,, | Performed by: INTERNAL MEDICINE

## 2025-05-24 PROCEDURE — 80053 COMPREHEN METABOLIC PANEL: CPT | Mod: ER | Performed by: EMERGENCY MEDICINE

## 2025-05-24 PROCEDURE — 87635 SARS-COV-2 COVID-19 AMP PRB: CPT | Mod: ER | Performed by: EMERGENCY MEDICINE

## 2025-05-24 PROCEDURE — 83880 ASSAY OF NATRIURETIC PEPTIDE: CPT | Mod: ER | Performed by: EMERGENCY MEDICINE

## 2025-05-24 PROCEDURE — 85025 COMPLETE CBC W/AUTO DIFF WBC: CPT | Mod: ER | Performed by: EMERGENCY MEDICINE

## 2025-05-24 PROCEDURE — 93005 ELECTROCARDIOGRAM TRACING: CPT | Mod: ER

## 2025-05-24 RX ORDER — ACETAMINOPHEN 500 MG
1000 TABLET ORAL
Status: COMPLETED | OUTPATIENT
Start: 2025-05-24 | End: 2025-05-24

## 2025-05-24 RX ADMIN — ACETAMINOPHEN 1000 MG: 500 TABLET ORAL at 08:05

## 2025-05-24 NOTE — HOSPITAL COURSE
45-year-old female who recently suffered a left closed nondisplaced fracture of lateral malleolus of left fibula, evaluated by ortho on 05/19, presented to [facility] for worsening shortness of breath.  Found to have bilateral PEs with right heart strain, started on heparin ggt,  emergently taken for successful thrombectomy by IR.  She is 67 clinical without complaints during my evaluation.  Denies any chest pain, lightheadedness dizziness, worsening dyspnea on exertion.     On exam her left lower extremity pulses intact, currently in immobilizing boot.  Site thrombectomy without any bleeding.  She is not any supplemental oxygen.  Had a troponin of 1.275 with 3rd troponin repeat 0.512.  Hemodynamically stable not requiring any oxygen.     Bilateral PE with right heart strain  Left malleolus and fibula fracture  NSTEMI type 2     Plan:  -stop heparin ggt, transitioned to Eliquis  -cardiology consult for NSTEMI.  Outpatient stress test.  Troponin trending down  -not requiring supplemental oxygen and ambulatory with assistive device.  Has rolling walker at home she can use per hour PT recommendations.  -per ortho, weight bearing as tolerated for 8 weeks, physical therapy to start after healing.  Remain in immobilization boot. Elevate affected extremity. Ice affected extremity for 15-20 minutes 3-4 times daily. Limit walking to pain free, uses crutches/scooter as necessary if not pain free, less then 2000 steps/day.  Tylenol for pain or tramadol.  Avoid NSAIDs to avoid bleeding. Vitamin D3 5000IU/day, calcium 1000 mg/day

## 2025-05-24 NOTE — DISCHARGE SUMMARY
[facility] Medicine  Discharge Summary      Patient Name: Chandni Wu  MRN: [id number]  KHADAR: [id number]  Patient Class: IP- Inpatient  Admission Date: 5/21/2025  Hospital Length of Stay: 2 days  Discharge Date and Time: 5/23/2025  2:16 PM  Attending Physician: No att. providers found   Discharging Provider: Latanya Sagastume MD  Primary Care Provider: Misael Bowling MD    Primary Care Team: Networked reference to record PCT     HPI:   Patient is a 45-year-old  female with a PMH of Crohn's disease, GERD, hypothyroidism, DENNIS, cholelithiasis who presents to [facility] ED with shortness a breath.  Of note patient recently sustained distal fibula fracture approximately 2 days ago.  She followed up with Orthopedic surgery and was placed in walking boot with weight-bearing as tolerated.  Shortness a breath has progressively worsened.  Associated symptoms include palpitations.  Denies any lower extremity swelling. Patient states she has been ambulating with a knee scooter and weight bears as tolerated. Denies any oral contraceptive use or prior hx of blood clots.     In the ED, heart rate 128 O2 saturation 96% on room air.  WBC 13.3k, D-dimer 2.26, troponin 1.275.  CTA chest showed bilateral pulmonary embolism involving the main pulmonary arteries extending into segmental and lobar branches of all lobes of the lung. Right ventricle strain and hypertrophy is present.  Case was discussed with interventional radiology who recommended initiating heparin drip.  Patient was taken immediately for thrombectomy upon arrival to [facility].         Procedure(s) (LRB):  EXAM UNDER ANESTHESIA (N/A)      Hospital Course:   45-year-old female who recently suffered a left closed nondisplaced fracture of lateral malleolus of left fibula, evaluated by ortho on 05/19, presented to [facility] for worsening shortness of breath.  Found to have bilateral PEs with right heart strain, started on heparin ggt,   emergently taken for successful thrombectomy by IR.  She is 67 clinical without complaints during my evaluation.  Denies any chest pain, lightheadedness dizziness, worsening dyspnea on exertion.     On exam her left lower extremity pulses intact, currently in immobilizing boot.  Site thrombectomy without any bleeding.  She is not any supplemental oxygen.  Had a troponin of 1.275 with 3rd troponin repeat 0.512.  Hemodynamically stable not requiring any oxygen.     Bilateral PE with right heart strain  Left malleolus and fibula fracture  NSTEMI type 2     Plan:  -stop heparin ggt, transitioned to Eliquis  -cardiology consult for NSTEMI.  Outpatient stress test.  Troponin trending down  -not requiring supplemental oxygen and ambulatory with assistive device.  Has rolling walker at home she can use per hour PT recommendations.  -per ortho, weight bearing as tolerated for 8 weeks, physical therapy to start after healing.  Remain in immobilization boot. Elevate affected extremity. Ice affected extremity for 15-20 minutes 3-4 times daily. Limit walking to pain free, uses crutches/scooter as necessary if not pain free, less then 2000 steps/day.  Tylenol for pain or tramadol.  Avoid NSAIDs to avoid bleeding. Vitamin D3 5000IU/day, calcium 1000 mg/day     Goals of Care Treatment Preferences:  Code Status: Full Code      SDOH Screening:  The patient declined to be screened for utility difficulties, food insecurity, transport difficulties, housing insecurity, and interpersonal safety, so no concerns could be identified this admission.     Consults:   Consults (From admission, onward)          Status Ordering Provider     Inpatient consult to Cardiology  Once        Provider:  Brown Martinez MD    Completed PRIMITIVO MATHIS     Inpatient consult to Interventional Radiology  Once        Provider:  Jackie Dodd MD    Completed DORIE MARK JR            Final Active Diagnoses:    Diagnosis Date Noted POA     "PRINCIPAL PROBLEM:  Acute pulmonary embolism with acute cor pulmonale [I26.09] 05/22/2025 Yes    Closed fracture of distal end of left fibula [S82.832A] 05/22/2025 Yes    Elevated troponin [R79.89] 05/22/2025 Yes    GERD (gastroesophageal reflux disease) [K21.9] 05/22/2025 Yes    DENNIS (generalized anxiety disorder) [F41.1] 05/22/2025 Yes    Hypothyroidism [E03.9] 10/07/2022 Yes      Problems Resolved During this Admission:       Discharged Condition: good    Disposition: Home or Self Care    Follow Up:   Follow-up Information       Donnie Mcknight MD Follow up.    Specialty: Internal Medicine  Why: Hematology referral faxed to clinic for review  Contact information:  [address] [address] [phone]                           Patient Instructions:      Ambulatory referral/consult to Hematology / Oncology   Standing Status: Future   Referral Priority: Routine Referral Type: Consultation   Referral Reason: Specialty Services Required   Requested Specialty: Hematology and Oncology   Number of Visits Requested: 1     Diet Cardiac     Activity as tolerated       Significant Diagnostic Studies: Labs: CMP   Recent Labs   Lab 05/23/25  0546      K 3.7      CO2 23   *   BUN 12   CREATININE 0.7   CALCIUM 8.5*   ANIONGAP 9    and CBC No results for input(s): "WBC", "HGB", "HCT", "PLT" in the last 48 hours.    Pending Diagnostic Studies:       Procedure Component Value Units Date/Time    IR Thrombectomy [employer] Thrombolysis and Fluoro [[id number]] Resulted: 05/22/25 0300    Order Status: Sent Lab Status: In process Updated: 05/22/25 0902           Medications:  Reconciled Home Medications:      Medication List        PAUSE taking these medications      naproxen 500 MG tablet  Wait to take this until your doctor or other care provider tells you to start again.  Commonly known as: NAPROSYN  Take 1 tablet (500 mg total) by mouth 2 (two) times daily with meals.            START " taking these medications      ELIQUIS DVT-PE TREAT 30D START 5 mg (74 tabs) Dspk  Generic drug: apixaban  For the first 7 days take two 5 mg tablets twice daily.  After 7 days take one 5 mg tablet twice daily.            CONTINUE taking these medications      levothyroxine 50 MCG tablet  Commonly known as: SYNTHROID  Take 1 tablet (50 mcg total) by mouth once daily.     tamsulosin 0.4 mg Cap  Commonly known as: FLOMAX  Take 1 capsule (0.4 mg total) by mouth once daily.              Indwelling Lines/Drains at time of discharge:   Lines/Drains/Airways       None                   Time spent on the discharge of patient:  40 minutes         Latanya Sagastume MD  Department of Hospital Medicine  [facility] - Telemetry ([facility])

## 2025-05-24 NOTE — TELEPHONE ENCOUNTER
Acute on chronic  Nephrology following   Patient c/o a fever of 100.5 and wheezing. She is 3 days post thrombectomy. Advised per protocol to go to the nearest ED now. Patient verbalizes understanding. Advised the patient to call back with any further questions or if symptoms worsen.        Reason for Disposition   [1] Fever > 100 F (37.8 C) AND [2] surgery in the last month    Additional Information   Negative: Shock suspected (e.g., cold/pale/clammy skin, too weak to stand, low BP, rapid pulse)   Negative: Difficult to awaken or acting confused (e.g., disoriented, slurred speech)   Negative: [1] Difficulty breathing AND [2] bluish lips, tongue or face   Negative: New-onset rash with multiple purple (or blood-colored) spots or dots   Negative: Sounds like a life-threatening emergency to the triager   Negative: [1] Headache AND [2] stiff neck (can't touch chin to chest)   Negative: Difficulty breathing   Negative: IV Drug Use (IVDU)   Negative: [1] Drinking very little AND [2] dehydration suspected (e.g., no urine > 12 hours, very dry mouth, very lightheaded)   Negative: Widespread rash and cause unknown   Negative: Patient sounds very sick or weak to the triager  (Exception: Mild weakness AND hasn't taken fever medicine.)   Negative: Fever > 104 F (40 C)   Negative: [1] Fever > 101 F (38.3 C) AND [2] age > 60 years   Negative: [1] Fever > 100 F (37.8 C) AND [2] bedridden (e.g., CVA, chronic illness, recovering from surgery)   Negative: [1] Fever > 100 F (37.8 C) AND [2] indwelling urinary catheter (e.g., Fall, Coude)   Negative: [1] Fever > 100 F (37.8 C) AND [2] has port (portacath), central line, or PICC line   Negative: [1] Fever > 100 F (37.8 C) AND [2] diabetes mellitus or weak immune system (e.g., HIV positive, cancer chemo, splenectomy, organ transplant, chronic steroids)    Protocols used: Fever-A-AH

## 2025-05-25 LAB
OHS QRS DURATION: 106 MS
OHS QTC CALCULATION: 461 MS

## 2025-05-30 LAB
BACTERIA BLD CULT: NORMAL
BACTERIA BLD CULT: NORMAL

## 2025-06-17 ENCOUNTER — OFFICE VISIT (OUTPATIENT)
Dept: CARDIOLOGY | Facility: CLINIC | Age: 45
End: 2025-06-17
Payer: COMMERCIAL

## 2025-06-17 VITALS
HEIGHT: 65 IN | WEIGHT: 221.31 LBS | BODY MASS INDEX: 36.87 KG/M2 | OXYGEN SATURATION: 99 % | DIASTOLIC BLOOD PRESSURE: 76 MMHG | HEART RATE: 86 BPM | SYSTOLIC BLOOD PRESSURE: 116 MMHG

## 2025-06-17 DIAGNOSIS — I26.02 ACUTE SADDLE PULMONARY EMBOLISM WITH ACUTE COR PULMONALE: Primary | ICD-10-CM

## 2025-06-17 DIAGNOSIS — E78.49 OTHER HYPERLIPIDEMIA: ICD-10-CM

## 2025-06-17 DIAGNOSIS — S82.832S OTHER CLOSED FRACTURE OF DISTAL END OF LEFT FIBULA, SEQUELA: ICD-10-CM

## 2025-06-17 PROCEDURE — 99999 PR PBB SHADOW E&M-EST. PATIENT-LVL III: CPT | Mod: PBBFAC,,, | Performed by: INTERNAL MEDICINE

## 2025-06-17 PROCEDURE — 3078F DIAST BP <80 MM HG: CPT | Mod: CPTII,S$GLB,, | Performed by: INTERNAL MEDICINE

## 2025-06-17 PROCEDURE — 1160F RVW MEDS BY RX/DR IN RCRD: CPT | Mod: CPTII,S$GLB,, | Performed by: INTERNAL MEDICINE

## 2025-06-17 PROCEDURE — 3008F BODY MASS INDEX DOCD: CPT | Mod: CPTII,S$GLB,, | Performed by: INTERNAL MEDICINE

## 2025-06-17 PROCEDURE — 1111F DSCHRG MED/CURRENT MED MERGE: CPT | Mod: CPTII,S$GLB,, | Performed by: INTERNAL MEDICINE

## 2025-06-17 PROCEDURE — 1159F MED LIST DOCD IN RCRD: CPT | Mod: CPTII,S$GLB,, | Performed by: INTERNAL MEDICINE

## 2025-06-17 PROCEDURE — 99214 OFFICE O/P EST MOD 30 MIN: CPT | Mod: S$GLB,,, | Performed by: INTERNAL MEDICINE

## 2025-06-17 PROCEDURE — 3074F SYST BP LT 130 MM HG: CPT | Mod: CPTII,S$GLB,, | Performed by: INTERNAL MEDICINE
